# Patient Record
Sex: FEMALE | Race: WHITE | NOT HISPANIC OR LATINO | Employment: PART TIME | URBAN - NONMETROPOLITAN AREA
[De-identification: names, ages, dates, MRNs, and addresses within clinical notes are randomized per-mention and may not be internally consistent; named-entity substitution may affect disease eponyms.]

---

## 2023-05-09 LAB
THYROTROPIN (MIU/L) IN SER/PLAS BY DETECTION LIMIT <= 0.05 MIU/L: 4.11 MIU/L (ref 0.44–3.98)
THYROXINE (T4) FREE (NG/DL) IN SER/PLAS: 0.89 NG/DL (ref 0.61–1.12)

## 2023-11-14 ENCOUNTER — APPOINTMENT (OUTPATIENT)
Dept: CARDIOLOGY | Facility: HOSPITAL | Age: 73
End: 2023-11-14
Payer: MEDICARE

## 2023-11-21 ENCOUNTER — APPOINTMENT (OUTPATIENT)
Dept: CARDIOLOGY | Facility: HOSPITAL | Age: 73
End: 2023-11-21
Payer: MEDICARE

## 2023-11-28 ENCOUNTER — APPOINTMENT (OUTPATIENT)
Dept: CARDIOLOGY | Facility: HOSPITAL | Age: 73
End: 2023-11-28
Payer: MEDICARE

## 2023-11-30 ENCOUNTER — APPOINTMENT (OUTPATIENT)
Dept: CARDIOLOGY | Facility: HOSPITAL | Age: 73
End: 2023-11-30
Payer: MEDICARE

## 2023-12-07 ENCOUNTER — HOSPITAL ENCOUNTER (OUTPATIENT)
Dept: CARDIOLOGY | Facility: HOSPITAL | Age: 73
Discharge: HOME | End: 2023-12-07
Payer: MEDICARE

## 2023-12-07 DIAGNOSIS — I50.22 CHRONIC SYSTOLIC (CONGESTIVE) HEART FAILURE (MULTI): ICD-10-CM

## 2023-12-14 ENCOUNTER — HOSPITAL ENCOUNTER (OUTPATIENT)
Dept: CARDIOLOGY | Facility: HOSPITAL | Age: 73
Discharge: HOME | End: 2023-12-14
Payer: MEDICARE

## 2023-12-14 VITALS — DIASTOLIC BLOOD PRESSURE: 100 MMHG | SYSTOLIC BLOOD PRESSURE: 146 MMHG | HEART RATE: 94 BPM

## 2023-12-14 DIAGNOSIS — I48.91 UNSPECIFIED ATRIAL FIBRILLATION (MULTI): ICD-10-CM

## 2023-12-14 DIAGNOSIS — I50.22 CHRONIC SYSTOLIC (CONGESTIVE) HEART FAILURE (MULTI): ICD-10-CM

## 2023-12-14 PROCEDURE — 93350 STRESS TTE ONLY: CPT | Performed by: STUDENT IN AN ORGANIZED HEALTH CARE EDUCATION/TRAINING PROGRAM

## 2023-12-14 PROCEDURE — 93018 CV STRESS TEST I&R ONLY: CPT | Performed by: STUDENT IN AN ORGANIZED HEALTH CARE EDUCATION/TRAINING PROGRAM

## 2023-12-14 PROCEDURE — 93350 STRESS TTE ONLY: CPT

## 2023-12-14 PROCEDURE — 93321 DOPPLER ECHO F-UP/LMTD STD: CPT

## 2023-12-14 PROCEDURE — 93016 CV STRESS TEST SUPVJ ONLY: CPT | Performed by: STUDENT IN AN ORGANIZED HEALTH CARE EDUCATION/TRAINING PROGRAM

## 2023-12-14 PROCEDURE — 93321 DOPPLER ECHO F-UP/LMTD STD: CPT | Performed by: STUDENT IN AN ORGANIZED HEALTH CARE EDUCATION/TRAINING PROGRAM

## 2023-12-14 PROCEDURE — 93017 CV STRESS TEST TRACING ONLY: CPT

## 2024-03-26 ENCOUNTER — HOSPITAL ENCOUNTER (OUTPATIENT)
Dept: RADIOLOGY | Facility: EXTERNAL LOCATION | Age: 74
Discharge: HOME | End: 2024-03-26

## 2024-03-26 ENCOUNTER — HOSPITAL ENCOUNTER (OUTPATIENT)
Dept: RADIOLOGY | Facility: HOSPITAL | Age: 74
Discharge: HOME | End: 2024-03-26
Payer: COMMERCIAL

## 2024-03-26 VITALS — WEIGHT: 208 LBS | BODY MASS INDEX: 28.17 KG/M2 | HEIGHT: 72 IN

## 2024-03-26 DIAGNOSIS — R92.8 OTHER ABNORMAL AND INCONCLUSIVE FINDINGS ON DIAGNOSTIC IMAGING OF BREAST: ICD-10-CM

## 2024-03-26 PROCEDURE — 77062 BREAST TOMOSYNTHESIS BI: CPT

## 2024-03-26 PROCEDURE — 77066 DX MAMMO INCL CAD BI: CPT | Performed by: RADIOLOGY

## 2024-03-26 PROCEDURE — G0279 TOMOSYNTHESIS, MAMMO: HCPCS | Performed by: RADIOLOGY

## 2024-04-02 NOTE — PROGRESS NOTES
Carolin Talamantes female   1950 74 y.o.   [id number]      Chief Complaint  Biopsy consultation    History Of Present Illness  Carolin Talamantes is a 74 y.o.  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 She has no family history of breast cancer. She denies breast surgery or biopsy. She had right breast pain on 2024. She states a few days later she palpated a right breast mass and shortly after noticed right breast erythema. She denies trauma to the breast. She is currently on Eliquis for A-fib and also reports she is the sole provider for her  who is on an LVAD.     BREAST IMAGING: 3/22/2024 [facility] Right breast ultrasound, indicates BI-RADS Category 5. Right breast mass warranting additional views. 3/26/2024 [facility] Bilateral diagnostic mammogram, indicates BI-RADS Category 5. Right breast, 12:00, 6 cm from the nipple, 4.0 x 2.0 x 2.8 cm irregular spiculated mass with mild associated architectural distortion and associated pleomorphic calcifications. Ultrasound guided biopsy recommended.     REPRODUCTIVE HISTORY: menarche age 15, , first birth age 26,  x 33 months, OCP's x 1 year, natural menopause age 57, no HRT, scattered fibroglandular tissue    FAMILY CANCER HISTORY:   None     Surgical History  She has no past surgical history on file.     Social History  She reports that she has never smoked. She has never used smokeless tobacco. No history on file for alcohol use and drug use.    Family History  No family history on file.     Allergies  Erythromycin    Medications  Current Outpatient Medications   Medication Instructions    apixaban (ELIQUIS) 5 mg, oral, 2 times daily    metoprolol succinate XL (TOPROL-XL) 25 mg, oral, Daily RT         REVIEW OF SYSTEMS    Constitutional:  Negative for appetite change, fatigue, fever and unexpected weight change.   HENT:  Negative for ear pain, hearing loss,  nosebleeds, sore throat and trouble swallowing.    Eyes:  Negative for discharge, itching and visual disturbance.   Respiratory:  Negative for cough, chest tightness and shortness of breath.    Cardiovascular:  Negative for chest pain, palpitations and leg swelling.   Breast: as indicated in HPI  Gastrointestinal:  Negative for abdominal pain, constipation, diarrhea and nausea.   Endocrine: Negative for cold intolerance and heat intolerance.   Genitourinary:  Negative for dysuria, frequency, hematuria, pelvic pain and vaginal bleeding.   Musculoskeletal:  Negative for arthralgias, back pain, gait problem, joint swelling and myalgias.   Skin:  Negative for color change and rash.   Allergic/Immunologic: Negative for environmental allergies and food allergies.   Neurological:  Negative for dizziness, tremors, speech difficulty, weakness, numbness and headaches.   Hematological:  Does not bruise/bleed easily.   Psychiatric/Behavioral:  Negative for agitation, dysphoric mood and sleep disturbance. The patient is not nervous/anxious.         Past Medical History  She has no past medical history on file.     Physical Exam  Patient is alert and oriented x3 and in a relaxed and appropriate mood. Her gait is steady and hand grasps are equal. Sclera is clear. The exam was limited due to request for sitting position. The breasts are nearly symmetrical. The left breast tissue is soft without palpable abnormalities, discrete nodules or masses. The left breast, subareolar region is tender to touch. The right breast, 11:00, 5 cm from the nipple, 5.5 x 5.5 cm hard non-mobile mass. The surrounding skin with mild erythema and peau d' orange. The right breast, subareolar region is tender to touch. The left breast skin and nipple appear normal. There is no cervical, supraclavicular or left axillary lymphadenopathy. I am able to palpate a 2.0 cm right axillary lymph node. Heart rate and rhythm normal, S1 and S2 appreciated. The lungs are  clear to auscultation bilaterally. Abdomen is soft and non-tender.       Physical Exam  Chest:              Last Recorded Vitals  Vitals:    04/09/24 1205   BP: 114/83   Pulse: 108   Resp: 18       Relevant Results   Time was spent discussing digital images of the radiology testing with the patient. I explained the results in depth, along with suggested explanation for follow up recommendations based on the testing results. BI-RADS Category 5    Imaging  Narrative   Interpreted By:  Gareth Zarate,  STUDY:  BI MAMMO BILATERAL DIAGNOSTIC TOMOSYNTHESIS; 3/26/2024 1:40 pm      ACCESSION NUMBER(S):  [id number]      ORDERING CLINICIAN:  SALLIE CRANDALL      INDICATION:  Diagnostic mammogram, Signs/Symptoms:inconclusive findings on dx  imaging of breast 3/22/2024.      COMPARISON:  Ultrasound of the right breast dated 03/22/2024      FINDINGS:  CC and MLO 2D digital mammograms and digital breast tomosynthesis  images were obtained of the bilateral breasts. 3-D volume images were  reconstructed in 4 views at an independent workstation as 1 mm slices  through the breasts in both the CC and MLO projections.      Density:  There are areas of scattered fibroglandular tissue.      There is an irregular spiculated mass with mild associated  architectural distortion and associated pleomorphic calcifications  identified in the upper outer quadrant of the right breast,  corresponding with the mass seen on ultrasound. No additional mass or  focal asymmetry is identified.      This study was interpreted with CAD.       Impression   Mass in the right breast, highly suggestive in appearance for  malignancy. Further evaluation with ultrasound-guided biopsy is  recommended.      BI-RADS CATEGORY:  BI-RADS CATEGORY:  5 Highly Suggestive of Malignancy.  Recommendation:  Biopsy.  Recommended Date:  Immediate.  Laterality:  Bilateral.           BI US breast limited right  Order: [id number]  Impression    BIRADS 5 - Highly suggestive of  malignancy, skin thickening may suggest an inflammatory breast component.  Recommend bilateral mammography and/or MRI, possibly PET staging if positive cytology is obtained given these current aggressive features.      TRANSCRIBED BY:        ELECTRONICALLY SIGNED BY: Manuel Soriano MD  Narrative      FINDINGS:  Targeted sonographic evaluation of the right breast was performed.  Irregular-shaped, non-parallel, microlobulated, spiculated, angular, hypoechogenic mass 12 o'clock location 6 cm the nipple, epicenter 2 cm from the skin surface, 4.0 x 2.0 x 2.8 cm.  Posterior shadowing, possible neighboring satellite lesions. Skin thickening.  No benign features.  Exam End: 03/22/24 15:21    Specimen Collected: 03/22/24 15:42 Last Resulted: 03/25/24 07:00   Received From: [facility]        Assessment/Plan   Abnormal mammogram, right breast mass, right axillary lymphadenopathy, no family history of breast cancer, scattered fibroglandular tissue    Plan: Right breast ultrasound guided biopsy. Highly recommended rescan of right axilla.     Patient Discussion/Summary  I recommend a right breast ultrasound guided biopsy. A breast radiology physician will perform the procedure. Possible diagnoses include benign, atypia or cancer. Bruising and mild discomfort after the biopsy is normal and will improve. I typically have results in 3-5 business days. I will call you with the results, please have your phone handy to take my call. If you receive medical information from [facility] Personal Health Record, it is possible to view or see results of your biopsy or procedure before I contact you directly. I will provide recommendations for future follow up based on your biopsy results.     You can see your health information, review clinical summaries from office visits & test results online when you follow your health with MY  Chart, a personal health record. To sign up go to [email or web address]. If you need assistance  with signing up or trouble getting into your account call [employer] Patient Line 24/7 at [phone].    Should you have any questions or concerns after biopsy, please do not hesitate to call my office at [phone]. If it has been more than a week since your biopsy was performed and you have not received results, please call my office at [phone]. Thank you for choosing [facility] and trusting me as your healthcare provider. I am honored to be a provider on your health care team and I remain dedicated to helping you achieve your health goals.      Elda Maier, APRN-CNP

## 2024-04-03 ENCOUNTER — APPOINTMENT (OUTPATIENT)
Dept: SURGICAL ONCOLOGY | Facility: HOSPITAL | Age: 74
End: 2024-04-03
Payer: COMMERCIAL

## 2024-04-09 ENCOUNTER — HOSPITAL ENCOUNTER (OUTPATIENT)
Dept: RADIOLOGY | Facility: HOSPITAL | Age: 74
Discharge: HOME | End: 2024-04-09
Payer: COMMERCIAL

## 2024-04-09 ENCOUNTER — PROCEDURE VISIT (OUTPATIENT)
Dept: SURGICAL ONCOLOGY | Facility: HOSPITAL | Age: 74
End: 2024-04-09
Payer: COMMERCIAL

## 2024-04-09 VITALS
BODY MASS INDEX: 28.04 KG/M2 | WEIGHT: 207 LBS | DIASTOLIC BLOOD PRESSURE: 83 MMHG | HEIGHT: 72 IN | RESPIRATION RATE: 18 BRPM | SYSTOLIC BLOOD PRESSURE: 114 MMHG | HEART RATE: 108 BPM

## 2024-04-09 DIAGNOSIS — R92.8 ABNORMAL MAMMOGRAM: ICD-10-CM

## 2024-04-09 DIAGNOSIS — N63.11 MASS OF UPPER OUTER QUADRANT OF RIGHT BREAST: ICD-10-CM

## 2024-04-09 DIAGNOSIS — R23.4 CHANGE OF SKIN OF BREAST: ICD-10-CM

## 2024-04-09 DIAGNOSIS — R59.0 AXILLARY LYMPHADENOPATHY: ICD-10-CM

## 2024-04-09 DIAGNOSIS — R92.8 ABNORMAL MAMMOGRAM: Primary | ICD-10-CM

## 2024-04-09 PROCEDURE — 19083 BX BREAST 1ST LESION US IMAG: CPT | Mod: RT

## 2024-04-09 PROCEDURE — 38505 NEEDLE BIOPSY LYMPH NODES: CPT | Mod: RIGHT SIDE | Performed by: STUDENT IN AN ORGANIZED HEALTH CARE EDUCATION/TRAINING PROGRAM

## 2024-04-09 PROCEDURE — 88360 TUMOR IMMUNOHISTOCHEM/MANUAL: CPT | Performed by: PATHOLOGY

## 2024-04-09 PROCEDURE — 99204 OFFICE O/P NEW MOD 45 MIN: CPT | Performed by: NURSE PRACTITIONER

## 2024-04-09 PROCEDURE — 88305 TISSUE EXAM BY PATHOLOGIST: CPT | Performed by: PATHOLOGY

## 2024-04-09 PROCEDURE — 2780000003 HC OR 278 NO HCPCS

## 2024-04-09 PROCEDURE — C1819 TISSUE LOCALIZATION-EXCISION: HCPCS

## 2024-04-09 PROCEDURE — 10035 PLMT SFT TISS LOCLZJ DEV 1ST: CPT | Mod: RIGHT SIDE | Performed by: STUDENT IN AN ORGANIZED HEALTH CARE EDUCATION/TRAINING PROGRAM

## 2024-04-09 PROCEDURE — 2500000005 HC RX 250 GENERAL PHARMACY W/O HCPCS: Performed by: STUDENT IN AN ORGANIZED HEALTH CARE EDUCATION/TRAINING PROGRAM

## 2024-04-09 PROCEDURE — 76642 ULTRASOUND BREAST LIMITED: CPT | Mod: RIGHT SIDE | Performed by: STUDENT IN AN ORGANIZED HEALTH CARE EDUCATION/TRAINING PROGRAM

## 2024-04-09 PROCEDURE — 77065 DX MAMMO INCL CAD UNI: CPT | Mod: RT

## 2024-04-09 PROCEDURE — 2720000007 HC OR 272 NO HCPCS

## 2024-04-09 PROCEDURE — 88305 TISSUE EXAM BY PATHOLOGIST: CPT | Mod: TC,STJLAB,91 | Performed by: NURSE PRACTITIONER

## 2024-04-09 PROCEDURE — 76982 USE 1ST TARGET LESION: CPT | Mod: RT

## 2024-04-09 PROCEDURE — A4648 IMPLANTABLE TISSUE MARKER: HCPCS

## 2024-04-09 PROCEDURE — 77065 DX MAMMO INCL CAD UNI: CPT | Mod: RIGHT SIDE | Performed by: STUDENT IN AN ORGANIZED HEALTH CARE EDUCATION/TRAINING PROGRAM

## 2024-04-09 PROCEDURE — 76642 ULTRASOUND BREAST LIMITED: CPT | Mod: RT

## 2024-04-09 PROCEDURE — 19286 PERQ DEV BREAST ADD US IMAG: CPT | Mod: RT

## 2024-04-09 PROCEDURE — 99214 OFFICE O/P EST MOD 30 MIN: CPT | Performed by: NURSE PRACTITIONER

## 2024-04-09 PROCEDURE — 19083 BX BREAST 1ST LESION US IMAG: CPT | Mod: RIGHT SIDE | Performed by: STUDENT IN AN ORGANIZED HEALTH CARE EDUCATION/TRAINING PROGRAM

## 2024-04-09 RX ORDER — METOPROLOL SUCCINATE 25 MG/1
25 TABLET, EXTENDED RELEASE ORAL
COMMUNITY
Start: 2023-07-17 | End: 2024-05-20 | Stop reason: SDUPTHER

## 2024-04-09 RX ADMIN — Medication 17 ML: at 14:30

## 2024-04-09 NOTE — PATIENT INSTRUCTIONS
I recommend a right breast ultrasound guided biopsy. A breast radiology physician will perform the procedure. Possible diagnoses include benign, atypia or cancer. Bruising and mild discomfort after the biopsy is normal and will improve. I typically have results in 3-5 business days. I will call you with the results, please have your phone handy to take my call. If you receive medical information from [facility] Personal Health Record, it is possible to view or see results of your biopsy or procedure before I contact you directly. I will provide recommendations for future follow up based on your biopsy results.     You can see your health information, review clinical summaries from office visits & test results online when you follow your health with MY  Chart, a personal health record. To sign up go to [email or web address]. If you need assistance with signing up or trouble getting into your account call [employer] Patient Line 24/7 at [phone].    Should you have any questions or concerns after biopsy, please do not hesitate to call my office at [phone]. If it has been more than a week since your biopsy was performed and you have not received results, please call my office at [phone]. Thank you for choosing [facility] and trusting me as your healthcare provider. I am honored to be a provider on your health care team and I remain dedicated to helping you achieve your health goals.

## 2024-04-15 ENCOUNTER — TELEPHONE (OUTPATIENT)
Dept: SURGERY | Facility: HOSPITAL | Age: 74
End: 2024-04-15
Payer: MEDICARE

## 2024-04-15 LAB
LAB AP ASR DISCLAIMER: NORMAL
LABORATORY COMMENT REPORT: NORMAL
PATH REPORT.ADDENDUM SPEC: NORMAL
PATH REPORT.FINAL DX SPEC: NORMAL
PATH REPORT.GROSS SPEC: NORMAL
PATH REPORT.RELEVANT HX SPEC: NORMAL
PATH REPORT.TOTAL CANCER: NORMAL
RESIDENT REVIEW: NORMAL

## 2024-04-15 NOTE — TELEPHONE ENCOUNTER
Spoke with Carolin regarding right breast and right axillary biopsy results. [place] [facility]

## 2024-04-22 NOTE — PROGRESS NOTES
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   Atrial fibrillation on Eliquis   -maintained anticoagulation for biopsies, but would need to stop anticoagulation for surgical intervention.  May require bridging with lovenox pending risk assessment.  Defer perioperative prophylaxis to prescribing physician.      Discussed the pathology and treatment options with the patient and family.    First, we discussed surgical options: breast conservation vs mastectomy.      Breast conservation involves removal of the tumor with a rim of normal breast tissue called a margin.  BCT is often done in conjunction with radiation to decrease risk of recurrence.  Mastectomy involves removal of all breast tissue.  When mastectomy is recommended, referral to a plastic surgeon for reconstruction is recommended.      Given the size of her cancer, she would be best served with mastectomy.    We then discussed the role of axillary staging. We discussed sentinel lymph node biopsy, indications for axillary node dissection and risks and benefits of each procedure. She has biopsy proven axillary sabino metastasis with several additional abnormal nodes.   In th presence of sabino involvement with HER2+ disease she would be recommended to have neoadjuvant systemic therapy with HER2 containing immune therapy to downstage the disease in the breast and lymph nodes to possibly allow less extensive axillary surgery if she were to have a complete sabino response which would decrease her risk for lymphedema.  We also discussed the extensive axillary involvement would not make her a candidate for primary surgery for various reasons including risk of injury to nearby neurovascular structures.    Radiation is usually recommended when BCT is performed.  Women 70 or greater with early  stage hormone receptor positive cancer may be able to omit radiation.  Radiation is recommended after mastectomy for tumors larger than 5cm, positive lymph nodes, or positive margins.  She has several indications for post mastectomy radiation given size >5cm and positive axillary nodes.    The need for chemotherapy will be based on surgical pathology and will be decided by the medical oncologist.  Genomic testing may be ordered on the tissue after surgical excision to help guide decision making for systemic therapy.  The medical oncologist is also responsible for prescribing endocrine therapy for hormone receptor positive tumors.       Carlos Talamantes is a 74 y.o. female presents today for evaluation of recently diagnosed carcinoma of the right breast.     The patient was initially referred for evaluation of breast pain first noted 2/14/2024.  Shortly after this she palpated a mass in the right breast with associated erythema.       Follow-up diagnostic imaging on 3/26/2024 at [facility] reveals a right breast mass at 12:00 6cmFN measuring 4cm.    Imaging repeated by  breast radiology 4/9/2024 with mass measuring 5.1cm with at least 3 abnormal nodes.    ultrasound guided biopsy was performed on 4/9/2024:    FINAL DIAGNOSIS   A. Breast, right, 11:00, 5 cm from nipple, ultrasound guided core needle biopsy:  -- Invasive ductal carcinoma, grade 3, with associated micro calcifications, see note.  -- Ductal carcinoma in situ, high nuclear grade, solid and cribriform pattern with necrosis and calcifications     Note: In this limited sample, the invasive carcinoma measures up to 15 mm in greatest dimension. ER, AK and HER2 will be reported in an addendum.     B. Lymph node, right, axilla, biopsy:  -- Metastatic carcinoma, see note.      Note: In this limited sample, the invasive carcinoma measures up to  10 mm in greatest dimension.     Estrogen Receptor (clone SP1):                     Positive                                                                           Percentage with nuclear stainin%                                                                          Intensity of staining: Moderate to strong      Progesterone Receptor (clone SP2):              Positive   Percentage with nuclear stainin%                                                                          Intensity of staining: Moderate to strong      HER2 (clone 4B5):                                          Positive (3+)      Of note, patient is on Eliquis for afib.    Menarche: 15  AFLB: 26  Menopause: 57  HRT: no  Previous biopsies: No  Previous breast surgeries: No  Prior breast cancer: No    Family history: no breast or ovarian cancer.  Thinks there may be uterine cancer history in the family.    Review of Systems   Constitutional symptoms: Denies generalized fatigue.  Denies weight change, fevers/chills, difficulty sleeping   Eyes: Denies double vision, glaucoma, cataracts.  Ear/nose/throat/mouth: Denies hearing changes, sore throat, sinus problems.  Cardiovascular: No chest pain.  Denies irregular heartbeat.  Denies ankle swelling.  Respiratory: No wheezing, cough, or shortness of breath.  Gastrointestinal: No abdominal pain,  No nausea/vomiting.  No indigestion/heartburn.  No change in bowel habits.  No constipation or diarrhea.   Genitourinary: No urinary incontinence.  No urinary frequency.  No painful urination.  Musculoskeletal: No bone pain, no muscle pain, no joint pain.   Integumentary: No rash. No masses.  No changes in moles.  No easy bruising.  Neurological: No headaches.  No tremors. No numbness/tingling.  Psychiatric: No anxiety. No depression.  Endocrine: No excessive thirst.  Not too hot or too cold.  Not tired or fatigued.    Hematological/lymphatic: No swollen glands or blood clotting problems.  No bruising.    Objective   Physical Exam  General: Alert and oriented x 3.  Mood and affect are appropriate.  HEENT:  EOMI, PERRLA.   Neck: supple, no masses, no cervical adenopathy.  Cardiovascular: no lower extremity edema.  Pulmonary: breathing non labored on room air.  GI: Abdomen soft, no masses. No hepatomegaly or splenomegaly.  Lymph nodes: No supraclavicular or axillary adenopathy bilaterally. There is conglomerate of lymph nodes palpable in the right axilla.  Musculoskeletal: Full range of motion in the upper extremities bilaterally.  Neuro: denies dizziness, tremors    Breasts: The breasts were examined in both the seated and supine positions.    RIGHT: The nipple is everted without nipple discharge.  There is a faint hue of erythema in the breast.  Dependent edema but not peau d'orange.  This is not clinically consistent with inflammatory breast cancer. There is a large 5cm mass in the superior breast at 12:00 not involving skin or chest wall.    LEFT: The nipple is everted without nipple discharge.  There are no skin changes, skin thickening, or dimpling. There are no masses palpated in the LEFT breast.     Radiology review: All images and reports were personally reviewed.         Morena Garrido DO

## 2024-04-23 ENCOUNTER — LAB (OUTPATIENT)
Dept: LAB | Facility: CLINIC | Age: 74
End: 2024-04-23
Payer: COMMERCIAL

## 2024-04-23 ENCOUNTER — APPOINTMENT (OUTPATIENT)
Dept: HEMATOLOGY/ONCOLOGY | Facility: CLINIC | Age: 74
End: 2024-04-23
Payer: COMMERCIAL

## 2024-04-23 ENCOUNTER — HOSPITAL ENCOUNTER (OUTPATIENT)
Dept: RADIOLOGY | Facility: CLINIC | Age: 74
Discharge: HOME | End: 2024-04-23
Payer: COMMERCIAL

## 2024-04-23 ENCOUNTER — HOSPITAL ENCOUNTER (OUTPATIENT)
Dept: RADIOLOGY | Facility: CLINIC | Age: 74
End: 2024-04-23
Payer: COMMERCIAL

## 2024-04-23 ENCOUNTER — OFFICE VISIT (OUTPATIENT)
Dept: HEMATOLOGY/ONCOLOGY | Facility: CLINIC | Age: 74
End: 2024-04-23
Payer: COMMERCIAL

## 2024-04-23 ENCOUNTER — OFFICE VISIT (OUTPATIENT)
Dept: SURGICAL ONCOLOGY | Facility: CLINIC | Age: 74
End: 2024-04-23
Payer: COMMERCIAL

## 2024-04-23 DIAGNOSIS — C50.111 MALIGNANT NEOPLASM OF CENTRAL PORTION OF RIGHT BREAST IN FEMALE, ESTROGEN RECEPTOR POSITIVE (MULTI): Primary | ICD-10-CM

## 2024-04-23 DIAGNOSIS — Z17.0 MALIGNANT NEOPLASM OF CENTRAL PORTION OF RIGHT BREAST IN FEMALE, ESTROGEN RECEPTOR POSITIVE (MULTI): ICD-10-CM

## 2024-04-23 DIAGNOSIS — C50.111 MALIGNANT NEOPLASM OF CENTRAL PORTION OF RIGHT BREAST IN FEMALE, ESTROGEN RECEPTOR POSITIVE (MULTI): ICD-10-CM

## 2024-04-23 DIAGNOSIS — Z51.81 ENCOUNTER FOR MONITORING CARDIOTOXIC DRUG THERAPY: ICD-10-CM

## 2024-04-23 DIAGNOSIS — Z17.0 MALIGNANT NEOPLASM OF CENTRAL PORTION OF RIGHT BREAST IN FEMALE, ESTROGEN RECEPTOR POSITIVE (MULTI): Primary | ICD-10-CM

## 2024-04-23 DIAGNOSIS — Z79.899 ENCOUNTER FOR MONITORING CARDIOTOXIC DRUG THERAPY: ICD-10-CM

## 2024-04-23 LAB
ALBUMIN SERPL BCP-MCNC: 4.3 G/DL (ref 3.4–5)
ALP SERPL-CCNC: 92 U/L (ref 33–136)
ALT SERPL W P-5'-P-CCNC: 21 U/L (ref 7–45)
ANION GAP SERPL CALC-SCNC: 10 MMOL/L (ref 10–20)
APTT PPP: 37 SECONDS (ref 27–38)
AST SERPL W P-5'-P-CCNC: 24 U/L (ref 9–39)
BASOPHILS # BLD AUTO: 0.03 X10*3/UL (ref 0–0.1)
BASOPHILS NFR BLD AUTO: 0.5 %
BILIRUB SERPL-MCNC: 0.7 MG/DL (ref 0–1.2)
BUN SERPL-MCNC: 23 MG/DL (ref 6–23)
CALCIUM SERPL-MCNC: 9.3 MG/DL (ref 8.6–10.3)
CHLORIDE SERPL-SCNC: 106 MMOL/L (ref 98–107)
CO2 SERPL-SCNC: 28 MMOL/L (ref 21–32)
CREAT SERPL-MCNC: 1.07 MG/DL (ref 0.5–1.05)
EGFRCR SERPLBLD CKD-EPI 2021: 55 ML/MIN/1.73M*2
EOSINOPHIL # BLD AUTO: 0.11 X10*3/UL (ref 0–0.4)
EOSINOPHIL NFR BLD AUTO: 1.8 %
ERYTHROCYTE [DISTWIDTH] IN BLOOD BY AUTOMATED COUNT: 13 % (ref 11.5–14.5)
GLUCOSE SERPL-MCNC: 101 MG/DL (ref 74–99)
HCT VFR BLD AUTO: 41.3 % (ref 36–46)
HGB BLD-MCNC: 13.5 G/DL (ref 12–16)
IMM GRANULOCYTES # BLD AUTO: 0.01 X10*3/UL (ref 0–0.5)
IMM GRANULOCYTES NFR BLD AUTO: 0.2 % (ref 0–0.9)
INR PPP: 1.1 (ref 0.9–1.1)
LYMPHOCYTES # BLD AUTO: 1.24 X10*3/UL (ref 0.8–3)
LYMPHOCYTES NFR BLD AUTO: 19.9 %
MCH RBC QN AUTO: 29.6 PG (ref 26–34)
MCHC RBC AUTO-ENTMCNC: 32.7 G/DL (ref 32–36)
MCV RBC AUTO: 91 FL (ref 80–100)
MONOCYTES # BLD AUTO: 0.41 X10*3/UL (ref 0.05–0.8)
MONOCYTES NFR BLD AUTO: 6.6 %
NEUTROPHILS # BLD AUTO: 4.42 X10*3/UL (ref 1.6–5.5)
NEUTROPHILS NFR BLD AUTO: 71 %
NRBC BLD-RTO: NORMAL /100{WBCS}
PLATELET # BLD AUTO: 194 X10*3/UL (ref 150–450)
POTASSIUM SERPL-SCNC: 4.3 MMOL/L (ref 3.5–5.3)
PROT SERPL-MCNC: 7.1 G/DL (ref 6.4–8.2)
PROTHROMBIN TIME: 12.6 SECONDS (ref 9.8–12.8)
RBC # BLD AUTO: 4.56 X10*6/UL (ref 4–5.2)
SODIUM SERPL-SCNC: 140 MMOL/L (ref 136–145)
WBC # BLD AUTO: 6.2 X10*3/UL (ref 4.4–11.3)

## 2024-04-23 PROCEDURE — 71260 CT THORAX DX C+: CPT | Performed by: RADIOLOGY

## 2024-04-23 PROCEDURE — 85610 PROTHROMBIN TIME: CPT

## 2024-04-23 PROCEDURE — 74177 CT ABD & PELVIS W/CONTRAST: CPT | Performed by: RADIOLOGY

## 2024-04-23 PROCEDURE — 74177 CT ABD & PELVIS W/CONTRAST: CPT

## 2024-04-23 PROCEDURE — 85025 COMPLETE CBC W/AUTO DIFF WBC: CPT

## 2024-04-23 PROCEDURE — 99205 OFFICE O/P NEW HI 60 MIN: CPT | Performed by: SURGERY

## 2024-04-23 PROCEDURE — 2550000001 HC RX 255 CONTRASTS: Performed by: SURGERY

## 2024-04-23 PROCEDURE — 85730 THROMBOPLASTIN TIME PARTIAL: CPT

## 2024-04-23 PROCEDURE — 36415 COLL VENOUS BLD VENIPUNCTURE: CPT

## 2024-04-23 PROCEDURE — 80053 COMPREHEN METABOLIC PANEL: CPT

## 2024-04-23 PROCEDURE — 99205 OFFICE O/P NEW HI 60 MIN: CPT | Performed by: STUDENT IN AN ORGANIZED HEALTH CARE EDUCATION/TRAINING PROGRAM

## 2024-04-23 PROCEDURE — 99215 OFFICE O/P EST HI 40 MIN: CPT | Mod: 25 | Performed by: STUDENT IN AN ORGANIZED HEALTH CARE EDUCATION/TRAINING PROGRAM

## 2024-04-23 PROCEDURE — 99215 OFFICE O/P EST HI 40 MIN: CPT | Mod: 25 | Performed by: SURGERY

## 2024-04-23 RX ORDER — PROCHLORPERAZINE MALEATE 5 MG
10 TABLET ORAL EVERY 6 HOURS PRN
Status: CANCELLED | OUTPATIENT
Start: 2024-05-07

## 2024-04-23 RX ORDER — ALBUTEROL SULFATE 0.83 MG/ML
3 SOLUTION RESPIRATORY (INHALATION) AS NEEDED
Status: CANCELLED | OUTPATIENT
Start: 2024-05-07

## 2024-04-23 RX ORDER — PROCHLORPERAZINE EDISYLATE 5 MG/ML
10 INJECTION INTRAMUSCULAR; INTRAVENOUS EVERY 6 HOURS PRN
Status: CANCELLED | OUTPATIENT
Start: 2024-05-07

## 2024-04-23 RX ORDER — DIPHENHYDRAMINE HYDROCHLORIDE 50 MG/ML
50 INJECTION INTRAMUSCULAR; INTRAVENOUS AS NEEDED
Status: CANCELLED | OUTPATIENT
Start: 2024-05-07

## 2024-04-23 RX ORDER — FAMOTIDINE 10 MG/ML
20 INJECTION INTRAVENOUS ONCE AS NEEDED
Status: CANCELLED | OUTPATIENT
Start: 2024-05-07

## 2024-04-23 RX ORDER — EPINEPHRINE 0.3 MG/.3ML
0.3 INJECTION SUBCUTANEOUS EVERY 5 MIN PRN
Status: CANCELLED | OUTPATIENT
Start: 2024-05-07

## 2024-04-23 RX ADMIN — IOHEXOL 75 ML: 350 INJECTION, SOLUTION INTRAVENOUS at 15:11

## 2024-04-23 NOTE — PROGRESS NOTES
Patient ID: Carolin Talamantes is a 74 y.o. female.    The patient presents to clinic today for her history of breast cancer.    Cancer Staging   Malignant neoplasm of central portion of right breast in female, estrogen receptor positive (Multi)  Staging form: Breast, AJCC 8th Edition  - Clinical: Stage IIB (cT3, cN1(f), cM0, G3, ER+, WV+, HER2+) - Signed by Morena Garrido DO on 4/23/2024        Diagnostic/Therapeutic History:    - 02/2024: breast pain followed by feeling a breast mass in her right breast    - 03/26/2024: follow up imaging on 03/26/2024 showed mass measuring 4cm at 12 oclock, 6 CFN    - 04/09/2024: US right breast showed a 5.1 cm mass at 11 oclock 5 CFN with at least 3 morphologically abnormal LN    - 04/09/2024: Right breast mass US guided core needle biopsy showed IDC G3 ER: 95%, WV: 20%, HER2 3+  with right axilla positive for metastatic carcinoma    History of Present Illness (HPI)/Interval History:  Accompanied by her  today    She denies any fevers or chills.    She denies any chest pain or breathing issues.     She denies any vision changes or headache issues, dizziness, loss of balance, neuropathy and no falls.     She denies any new or unexplained bone aches or pains.    She denies any skin lesions or masses, hair loss, nail changes, or oral sores.    She reports a normal appetite and normal bowel movements. Her weight is stable.     She denies any issues with sleep or fatigue.     PMH: Afib on eliquis and metoprolol    PSH: No surgery    Allergies: no allergies, GI intolerence to erythromycin    Social hx: no smoking, no alcohol    Family hx: no hx of cancer    Menarche at 15, menopause: 57, OCP x1 years,  No HRT      Review of Systems:  14-point ROS otherwise negative, as per HPI.    No past medical history on file.    No past surgical history on file.    Social History     Socioeconomic History    Marital status:      Spouse name: Not on file    Number of children: Not on  "file    Years of education: Not on file    Highest education level: Not on file   Occupational History    Not on file   Tobacco Use    Smoking status: Never    Smokeless tobacco: Never   Substance and Sexual Activity    Alcohol use: Not on file    Drug use: Not on file    Sexual activity: Not on file   Other Topics Concern    Not on file   Social History Narrative    Not on file     Social Determinants of Health     Financial Resource Strain: Not on file   Food Insecurity: Not on file   Transportation Needs: Not on file   Physical Activity: Not on file   Stress: Not on file   Social Connections: Not on file   Intimate Partner Violence: Not on file   Housing Stability: Not on file       Allergies   Allergen Reactions    Erythromycin GI Upset     Other reaction(s): GI Upset         Current Outpatient Medications:     apixaban (Eliquis) 5 mg tablet, Take 1 tablet (5 mg) by mouth twice a day., Disp: , Rfl:     metoprolol succinate XL (Toprol-XL) 25 mg 24 hr tablet, Take 1 tablet (25 mg) by mouth once daily., Disp: , Rfl:      Objective    BSA: There is no height or weight on file to calculate BSA.  There were no vitals taken for this visit.    ECOG Performance Status: 1    Physical Exam    GA: alert, oriented, cooperative  HEENT: anicteric sclera, well injected conjunctiva  Heart: RRR, no murmurs or gallops heard  Lung: CTAB  Abd: +BS, soft, non tender abdomen  Ext: peripheral pulses positive  Right breast: 5 cm mass not extending to chest wall, mild erythema    Laboratory Data:  No results found for: \"WBC\", \"HGB\", \"HCT\", \"MCV\", \"PLT\", \"ANC\"    Chemistry    No results found for: \"NA\", \"K\", \"CL\", \"CO2\", \"BUN\", \"CREATININE\", \"GLU\" No results found for: \"CALCIUM\", \"ALKPHOS\", \"AST\", \"ALT\", \"BILITOT\"          Radiology:  BI US guided breast localization and biopsy right, BI breast biopsy clip imaging  Addendum: Interpreted By:  Bernardo Damon,    ADDENDUM:   The final pathology for right breast mass at 11 o'clock, 5 cm from   " the nipple demonstrates invasive ductal carcinoma, grade 3, with   associated microcalcifications and ductal carcinoma in-situ, high   nuclear grade, solid and cribriform pattern with necrosis and   calcifications. This is concordant with the imaging findings. The   final pathology for the right axillary lymph node demonstrates   metastatic carcinoma. This is concordant with the imaging findings.   Follow-up with patient's surgical provider is recommended.        Estimated size of mass/extent of disease:  The mass measures 5.1 x   2.5 x 4.2 cm on US dated 04/09/2024. On spot magnification views,   calcifications measure up to 7.6 cm in AP extent and extend   anteriorly to approximately 2.3 cm from the nipple.        Ipsilateral lymph nodes: Biopsied lymph node is positive for   metastatic carcinoma. At least 3 morphologically abnormal lymph nodes   were visualized.        MRI recommendation: At the discretion of the referring surgeon.        Method of Detection: Category Pat - Patient Reported Self-examination   Finding        Signed by: Bernardo Damon 4/12/2024 4:35 PM        -------- ORIGINAL REPORT --------   Dictation workstation:   [id number]  Narrative: Interpreted By:  Bernardo Damon,   STUDY:  BI US GUIDED BREAST LOCALIZATION AND BIOPSY RIGHT; BI BREAST BIOPSY  CLIP IMAGING;  4/9/2024 3:06 pm; 4/9/2024 3:38 pm      ACCESSION NUMBER(S):  [id number]; [id number]      ORDERING CLINICIAN:  DAVID MCCRAY      INDICATION:  Abnormal right breast mass and axillary lymph node.      FINDINGS:  PREPROCEDURAL CONSULTATION: The history and physical exam pertinent  to the procedure were reviewed and no updates were made.      The procedure was explained to the patient including the risks,  benefits, and alternatives. Medications were discussed, including any  prior use of blood thinning medications by the patient. Patient  allergies were reviewed. The risks, including but not limited to  infection and bleeding, were  reviewed by the performing physician and  the patient agreed to undergo the procedure.      Prior to the procedure, an audible timeout was done to verify patient  identification, site and type of procedure. Dr. Bernardo Damon and an  ultrasound technologist were present.      PROCEDURE 1: Scanning of the right breast redemonstrated the mass of  concern in the 11 o'clock position, 5 cm from the nipple. The right  breast was marked under ultrasound guidance and cleansed. 10 mL of  buffered 1% lidocaine was injected subcutaneously and then into the  deeper tissues surrounding the mass. A small incision was made. 2  tissue core specimens were then obtained with a 12-gauge  spring-loaded biopsy needle with minimal bleeding (estimated blood  loss less than 10 mL).  A open coil Hydromark tissue marker was then  placed into the biopsy site.  Hemostasis was achieved with manual  compression. The patient tolerated the procedure without difficulty.      PROCEDURE 2: Scanning of the right axilla redemonstrated the lymph  node of concern. The right axilla was marked under ultrasound  guidance and cleansed.  7 mL of buffered 1% lidocaine was injected  subcutaneously and then into the deeper tissues surrounding the node.  A small incision was made. 3 tissue core specimens were then obtained  with a 14-gauge spring-loaded biopsy needle with minimal bleeding  (estimated blood loss less than 10 mL).  A butterfly Hydromark tissue  marker was then placed into the biopsy site.  Hemostasis was achieved  with manual compression. The patient tolerated the second procedure  without difficulty.      The postbiopsy mammogram demonstrates satisfactory placement of the  biopsy tissue marker in the breast. Biopsy marker in the right axilla  was not visualized on post clip mammography. Additional spot  magnification views were also obtained of the suspicious right breast  calcifications associated with her mass. Together, the mass and  calcifications  measure up to 7.6 cm in AP extent and are within  approximately 2.3 cm of the nipple. Additional biopsy can be  performed for extent of disease if clinically indicated.      The patient experienced no complications during the procedure.  Home-going/follow-up instructions were reviewed with the patient  before she left the department.      Impression: Status post ultrasound guided core needle biopsy of a right breast  mass and axillary lymph node followed by tissue marker placement.  Pathology is pending.      Additional spot magnification views were also obtained of the  suspicious right breast calcifications associated with her mass.  Together, the mass and calcifications measure up to 7.6 cm in AP  extent and are within approximately 2.3 cm of the nipple. Additional  biopsy can be performed for extent of disease if clinically indicated.      POST PROCEDURE MAMMOGRAM FOR MARKER PLACEMENT.          Signed by: Bernardo Damon 4/9/2024 5:17 PM  Dictation workstation:   [id number]       BI US breast limited right 04/09/2024    Narrative  Interpreted By:  Bernardo Damon,  STUDY:  BI US BREAST LIMITED RIGHT;  4/9/2024 2:03 pm    ACCESSION NUMBER(S):  [id number]    ORDERING CLINICIAN:  DAVID MCCRAY    INDICATION:  Repeat sonographic evaluation of the right breast and axilla was  performed prior to biopsy. Initially, patient declined repeat  mammogram, however she agreed that additional mammographic views  would be useful during her biopsy procedure in these were done during  her post clip imaging.    COMPARISON:  03/26/2024, 03/22/2024    FINDINGS:  Targeted ultrasound was performed of the right breast by a registered  sonographer with elastography. In the right breast at 11 o'clock, 5  cm from the nipple, in the patient's area of palpable concern, there  is an irregular hypoechoic mass containing internal calcifications  measuring at least 5.1 x 2.5 x 4.2 cm. This is highly concerning for  malignancy. Scanning of the  right axilla demonstrates at least 3  morphologically abnormal lymph nodes with cortical thickening.    Impression  Highly suspicious right breast mass with right axillary  lymphadenopathy. Biopsy will be subsequently performed. Additional  mammographic images were obtained at the time of post clip imaging.    BI-RADS CATEGORY:    BI-RADS CATEGORY:  5 Highly Suggestive of Malignancy.  Recommendation:  Surgical Consultation and Biopsy.  Recommended Date:  Immediate.  Laterality:  Right.    For any future breast imaging appointments, please call [phone] ([phone]).      MACRO:  Bernardo Damon discussed the significance and urgency of this critical  finding in person with  DAVID MCCRAY on 4/9/2024 at 1 pm.  (**-RCF-**) Findings:  See findings.    Signed by: Bernardo Damon 4/9/2024 5:14 PM  Dictation workstation:   [id number]          Assessment/Plan:    74 year old female patient with hx of Afib on metoprolol and eliquis with newly diagnosed HER2 positive BC presenting to discuss treatment options    - 02/2024: breast pain followed by feeling a breast mass in her right breast    - 03/26/2024: follow up imaging on 03/26/2024 showed mass measuring 4cm at 12 oclock, 6 CFN    - 04/09/2024: US right breast showed a 5.1 cm mass at 11 oclock 5 CFN with at least 3 morphologically abnormal LN    - 04/09/2024: Right breast mass US guided core needle biopsy showed IDC G3 ER: 95%, DE: 20%, HER2 3+  with right axilla positive for metastatic carcinoma     I reviewed with her the events that led to her diagnosis of breast cancer. We reviewed all the procedures and diagnostic imaging she underwent thus far. I discussed the features of her breast cancer that include the size, grade, lymph node status and  hormone receptor/ her2-bobby status. Per NCCN guidelines, for HER2 positive breast ca more than 2cm and/or +LN, the current recommendation would be neoadjuvant treatment with dual HER2  blockade as well as chemotherapy. The goal of  treatment would be to  downstage her cancer as well as to assess tumor chemosensitivity (whether or not a pathologic complete response is achieved or not). Carolin does not want to pursue chemotherapy and we agreed on doing dual HER2 blockade only with pertuzumab and trastuzumab, with plan for 6 cycles followed by surgery.      Plan to start in 2 weeks  onco echo, Onc cards referral  Patient agreable with plan of care   Staging scans with CT C/A/P and bone scan     At least 60 minutes of direct consultation was spent reviewing the patient's chart as well as discussing and  reviewing the  cancer care plan including educating and answering questions and concerns, greater than 50 percent spent in counseling and coordination of care.          Cassy Whaley MD  Hematology and Medical Oncology  [facility]

## 2024-04-24 ENCOUNTER — TELEPHONE (OUTPATIENT)
Dept: ADMISSION | Facility: HOSPITAL | Age: 74
End: 2024-04-24
Payer: MEDICARE

## 2024-04-24 ENCOUNTER — TELEPHONE (OUTPATIENT)
Dept: SURGICAL ONCOLOGY | Facility: HOSPITAL | Age: 74
End: 2024-04-24
Payer: MEDICARE

## 2024-04-24 DIAGNOSIS — C50.111 MALIGNANT NEOPLASM OF CENTRAL PORTION OF RIGHT BREAST IN FEMALE, ESTROGEN RECEPTOR POSITIVE (MULTI): ICD-10-CM

## 2024-04-24 DIAGNOSIS — Z17.0 MALIGNANT NEOPLASM OF CENTRAL PORTION OF RIGHT BREAST IN FEMALE, ESTROGEN RECEPTOR POSITIVE (MULTI): ICD-10-CM

## 2024-04-24 NOTE — TELEPHONE ENCOUNTER
Pt called- she saw her FUV/tx is scheduled in [place] on 5/15. 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 Also, her  has a standing appt on Tuesdays and Thursdays from 1230-130 so those days are hard also. She said she would be willing to see Dr. Whaley on Tuesdays in [place] if needed, but ideally she wants to have treatment on Monday at the [facility] location. She would like to speak to Yareli to discuss these scheduling concerns. Message sent to the team.

## 2024-04-24 NOTE — TELEPHONE ENCOUNTER
Pt called inquiring about her CT results. She is aware that you are operating today and will wait on your call.

## 2024-04-25 ENCOUNTER — HOSPITAL ENCOUNTER (OUTPATIENT)
Dept: RADIOLOGY | Facility: HOSPITAL | Age: 74
Discharge: HOME | End: 2024-04-25
Payer: COMMERCIAL

## 2024-04-25 ENCOUNTER — TELEPHONE (OUTPATIENT)
Dept: ADMISSION | Facility: HOSPITAL | Age: 74
End: 2024-04-25
Payer: MEDICARE

## 2024-04-25 DIAGNOSIS — C50.111 MALIGNANT NEOPLASM OF CENTRAL PORTION OF RIGHT BREAST IN FEMALE, ESTROGEN RECEPTOR POSITIVE (MULTI): ICD-10-CM

## 2024-04-25 DIAGNOSIS — Z17.0 MALIGNANT NEOPLASM OF CENTRAL PORTION OF RIGHT BREAST IN FEMALE, ESTROGEN RECEPTOR POSITIVE (MULTI): ICD-10-CM

## 2024-04-25 PROCEDURE — 78306 BONE IMAGING WHOLE BODY: CPT | Performed by: STUDENT IN AN ORGANIZED HEALTH CARE EDUCATION/TRAINING PROGRAM

## 2024-04-25 PROCEDURE — 3430000001 HC RX 343 DIAGNOSTIC RADIOPHARMACEUTICALS: Performed by: SURGERY

## 2024-04-25 PROCEDURE — 78306 BONE IMAGING WHOLE BODY: CPT

## 2024-04-25 PROCEDURE — A9503 TC99M MEDRONATE: HCPCS | Performed by: SURGERY

## 2024-04-25 RX ADMIN — TECHNETIUM TC 99M MEDRONATE 25.2 MILLICURIE: 25 INJECTION, POWDER, FOR SOLUTION INTRAVENOUS at 10:00

## 2024-04-25 NOTE — TELEPHONE ENCOUNTER
Carolin is asking if she will be starting an aromatase inhibitor after she completes her infusions.   If so, she is asking for a ballpark timeframe of when this would be.   Also asking if the AI stops progesterone in addition to the estrogen?

## 2024-04-26 ENCOUNTER — TELEPHONE (OUTPATIENT)
Dept: HEMATOLOGY/ONCOLOGY | Facility: HOSPITAL | Age: 74
End: 2024-04-26
Payer: MEDICARE

## 2024-04-26 NOTE — TELEPHONE ENCOUNTER
Carolin is calling back from this mornings dr hidalgo.  She is hoping to speak to him.  See note from this am.      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  She can take calls anytime after 11 am if you call back on Monday.      Message sent

## 2024-04-28 PROBLEM — Z78.9 NEVER SMOKED ANY SUBSTANCE: Status: ACTIVE | Noted: 2024-04-28

## 2024-04-28 PROBLEM — E78.5 HYPERLIPIDEMIA: Status: ACTIVE | Noted: 2024-04-28

## 2024-04-28 PROBLEM — I48.0 PAROXYSMAL ATRIAL FIBRILLATION (MULTI): Status: ACTIVE | Noted: 2024-04-28

## 2024-04-28 PROBLEM — I51.89 MILD LEFT VENTRICULAR SYSTOLIC DYSFUNCTION (LVSD): Status: ACTIVE | Noted: 2024-04-28

## 2024-04-28 PROBLEM — R94.31 ABNORMAL ELECTROCARDIOGRAM DURING EXERCISE STRESS TEST: Status: ACTIVE | Noted: 2024-04-28

## 2024-04-28 PROBLEM — I50.32 CHRONIC DIASTOLIC HEART FAILURE (MULTI): Status: ACTIVE | Noted: 2024-04-28

## 2024-04-28 PROBLEM — Z79.01 CHRONIC ANTICOAGULATION: Status: ACTIVE | Noted: 2024-04-28

## 2024-04-29 ENCOUNTER — OFFICE VISIT (OUTPATIENT)
Dept: CARDIOLOGY | Facility: CLINIC | Age: 74
End: 2024-04-29
Payer: COMMERCIAL

## 2024-04-29 VITALS
OXYGEN SATURATION: 97 % | DIASTOLIC BLOOD PRESSURE: 65 MMHG | RESPIRATION RATE: 16 BRPM | BODY MASS INDEX: 30.46 KG/M2 | SYSTOLIC BLOOD PRESSURE: 120 MMHG | HEART RATE: 96 BPM | TEMPERATURE: 97 F | WEIGHT: 212.74 LBS | HEIGHT: 70 IN

## 2024-04-29 DIAGNOSIS — Z78.9 NEVER SMOKED ANY SUBSTANCE: ICD-10-CM

## 2024-04-29 DIAGNOSIS — I51.89 MILD LEFT VENTRICULAR SYSTOLIC DYSFUNCTION (LVSD): ICD-10-CM

## 2024-04-29 DIAGNOSIS — E78.2 MIXED HYPERLIPIDEMIA: ICD-10-CM

## 2024-04-29 DIAGNOSIS — Z79.01 CHRONIC ANTICOAGULATION: ICD-10-CM

## 2024-04-29 DIAGNOSIS — R94.31 ABNORMAL ELECTROCARDIOGRAM DURING EXERCISE STRESS TEST: ICD-10-CM

## 2024-04-29 DIAGNOSIS — Z17.0 MALIGNANT NEOPLASM OF CENTRAL PORTION OF RIGHT BREAST IN FEMALE, ESTROGEN RECEPTOR POSITIVE (MULTI): ICD-10-CM

## 2024-04-29 DIAGNOSIS — Z79.899 ENCOUNTER FOR MONITORING CARDIOTOXIC DRUG THERAPY: Primary | ICD-10-CM

## 2024-04-29 DIAGNOSIS — I50.32 CHRONIC DIASTOLIC HEART FAILURE (MULTI): ICD-10-CM

## 2024-04-29 DIAGNOSIS — C50.111 MALIGNANT NEOPLASM OF CENTRAL PORTION OF RIGHT BREAST IN FEMALE, ESTROGEN RECEPTOR POSITIVE (MULTI): ICD-10-CM

## 2024-04-29 DIAGNOSIS — Z51.81 ENCOUNTER FOR MONITORING CARDIOTOXIC DRUG THERAPY: Primary | ICD-10-CM

## 2024-04-29 DIAGNOSIS — I48.11 LONGSTANDING PERSISTENT ATRIAL FIBRILLATION (MULTI): ICD-10-CM

## 2024-04-29 DIAGNOSIS — I48.0 PAROXYSMAL ATRIAL FIBRILLATION (MULTI): ICD-10-CM

## 2024-04-29 PROCEDURE — 93010 ELECTROCARDIOGRAM REPORT: CPT | Performed by: INTERNAL MEDICINE

## 2024-04-29 PROCEDURE — 93005 ELECTROCARDIOGRAM TRACING: CPT | Performed by: INTERNAL MEDICINE

## 2024-04-29 PROCEDURE — 1160F RVW MEDS BY RX/DR IN RCRD: CPT | Performed by: INTERNAL MEDICINE

## 2024-04-29 PROCEDURE — 1126F AMNT PAIN NOTED NONE PRSNT: CPT | Performed by: INTERNAL MEDICINE

## 2024-04-29 PROCEDURE — 99205 OFFICE O/P NEW HI 60 MIN: CPT | Performed by: INTERNAL MEDICINE

## 2024-04-29 PROCEDURE — 1159F MED LIST DOCD IN RCRD: CPT | Performed by: INTERNAL MEDICINE

## 2024-04-29 PROCEDURE — 99215 OFFICE O/P EST HI 40 MIN: CPT | Performed by: INTERNAL MEDICINE

## 2024-04-29 RX ORDER — LOSARTAN POTASSIUM 25 MG/1
25 TABLET ORAL DAILY
Qty: 30 TABLET | Refills: 11 | Status: SHIPPED | OUTPATIENT
Start: 2024-04-29 | End: 2024-04-30 | Stop reason: SDUPTHER

## 2024-04-29 ASSESSMENT — PATIENT HEALTH QUESTIONNAIRE - PHQ9
2. FEELING DOWN, DEPRESSED OR HOPELESS: NOT AT ALL
SUM OF ALL RESPONSES TO PHQ9 QUESTIONS 1 AND 2: 0
1. LITTLE INTEREST OR PLEASURE IN DOING THINGS: NOT AT ALL

## 2024-04-29 ASSESSMENT — ENCOUNTER SYMPTOMS
SLEEP DISTURBANCE: 1
DEPRESSION: 0
FATIGUE: 1
OCCASIONAL FEELINGS OF UNSTEADINESS: 0
LOSS OF SENSATION IN FEET: 0
ARTHRALGIAS: 1
RESPIRATORY NEGATIVE: 1
PALPITATIONS: 1
NEUROLOGICAL NEGATIVE: 1

## 2024-04-29 ASSESSMENT — COLUMBIA-SUICIDE SEVERITY RATING SCALE - C-SSRS
2. HAVE YOU ACTUALLY HAD ANY THOUGHTS OF KILLING YOURSELF?: NO
1. IN THE PAST MONTH, HAVE YOU WISHED YOU WERE DEAD OR WISHED YOU COULD GO TO SLEEP AND NOT WAKE UP?: NO
6. HAVE YOU EVER DONE ANYTHING, STARTED TO DO ANYTHING, OR PREPARED TO DO ANYTHING TO END YOUR LIFE?: NO

## 2024-04-29 ASSESSMENT — PAIN SCALES - GENERAL: PAINLEVEL: 0-NO PAIN

## 2024-04-29 NOTE — TELEPHONE ENCOUNTER
Per Dr. Whaley, he attempted to call patient back and left her a voice mail message. Dr. Whaley would like to be notified if she calls back.

## 2024-04-29 NOTE — PROGRESS NOTES
CARDIOLOGY NEW PATIENT OFFICE VISIT    Patient:  Carolin Talamantes  YOB: 1950  MRN: [id number]       Chief Complaint/Active Symptoms:       Carolin Talamantes is a 74 y.o. female who is being seen today at the request of Dr. Whaley for evaluation of Cardiotoxic drug therapy.    No chief complaint on file.      History of Present Illness:   HPI    Here prior to initiate therapy for breast cancer. Patient normally follows with a [facility] cardiologist at [place]. Saw her husbands cardiologist once for evaluation last year. Has a history of paroxysmal atrial fibrillation on oral anticoagulation that has been persistent for the past 18 months. On Metoprolol and eliquis without complications at this time.     Diagnosed with mild LV dysfunction by echo 7/2023. Had stress echo in December for part of work-up with a fib but without inducible ischemic / RWMA. No clinical cardiac event other than her atrial fibrillation. Prefers rate control and anticoagulation for treatment of her a fib. Has no chest pain or discomfort, no shortness of breath. No edema, claudication, history of stroke or TIA. Is active and teaching. Exercises regularly and does an elliptical for 30 minutes 2-3 days per week without difficulty and without recent change. Cardiologist note states she has chronic diastolic heart failure - patient unaware.     Patient voiced concerns about receiving potentially cardiotoxic drug therapy when she has mild LV dysfunction. Also concerned about multi-drug regimens. Referred patient back to her Oncologist to discuss regimens. Discussed Cut-offs recommended for treatment and that treatment decisions will be made by the patient and her Oncologist. We will support decisions and guide/make recommendations to help her receive the recommended treatment.     Cancer Diagnosis: Right breast cancer, ER+,FL+, HER2+  Oncologist: Jovana  Treatment: Pertuzumab/trastuzumab q 3 weeks to start 5/6/24    Risk Factors: Mild LV  dysfunction, longstanding persistent atrial fibrillation, Borderline HTN, HLD, chronic diastolic heart failure  SH: nonsmoker  FH: No premature CAD    Testing:  Stress echo 12/2023 - nondiagnostic EKG due to resting abn, mild LV dysfunction without inducible wall motion abn.   Echo 7/2023 EF 45-50%  CT cardiac score pending   CT chest/abd/pelvis - no remark on coronary calcifications.     Allergies:     Allergies   Allergen Reactions    Erythromycin GI Upset     Other reaction(s): GI Upset        Outpatient Medications:     Current Outpatient Medications   Medication Instructions    apixaban (ELIQUIS) 5 mg, oral, 2 times daily    metoprolol succinate XL (TOPROL-XL) 25 mg, oral, Daily RT        Past Medical History:     No past medical history on file.    Social History:     Social History     Socioeconomic History    Marital status:      Spouse name: Not on file    Number of children: Not on file    Years of education: Not on file    Highest education level: Not on file   Occupational History    Not on file   Tobacco Use    Smoking status: Never    Smokeless tobacco: Never   Substance and Sexual Activity    Alcohol use: Not on file    Drug use: Not on file    Sexual activity: Not on file   Other Topics Concern    Not on file   Social History Narrative    Not on file     Social Determinants of Health     Financial Resource Strain: Not on file   Food Insecurity: Not on file   Transportation Needs: Not on file   Physical Activity: Not on file   Stress: Not on file   Social Connections: Not on file   Intimate Partner Violence: Not on file   Housing Stability: Not on file       Family History:      No family history on file.    Review of Systems:     Review of Systems   Constitutional:  Positive for fatigue.   HENT:  Positive for congestion.    Respiratory: Negative.     Cardiovascular:  Positive for palpitations. Negative for chest pain and leg swelling.   Genitourinary: Negative.    Musculoskeletal:  Positive  for arthralgias.   Neurological: Negative.    Psychiatric/Behavioral:  Positive for sleep disturbance.    All other systems reviewed and are negative.      Objective:     Vitals:    04/29/24 1537   BP: 120/65   Pulse: 96   Resp: 16   Temp: 36.1 °C (97 °F)   SpO2: 97%       Physical Examination:   GENERAL:  Well developed, well nourished, in no acute distress.  HEENT: NC AT EOMI with anicteric sclera  NECK:  Supple, no JVD, no bruit.  CHEST:  Symmetric and nontender.  LUNGS:  Normal respiratory effort. Bilateral breath sounds clear to auscultation.   HEART:  PMI nondisplaced. Rate and rhythm regular with no evident murmur, no gallop appreciated. There are no rubs, clicks or heaves.  PERIPHERAL VASCULAR:  Pulses present and equally palpable; 2+ throughout.  ABDOMEN: Soft, NT, ND without HSM or palpable organomegaly, no bruits, normoactive bowel sounds  MUSCULOSKELETAL: No significant joint or limb deformity  EXTREMITIES:  Warm with good color, no clubbing or cyanosis.  There is no edema noted.  NEURO/PSYCH:  Alert and oriented times three with approppriate behavior and responses.  LYMPH: no significant palpable lymphadenopathy  SKIN: No rashes on exposed skin, no reported skin lesions.     Lab:     CBC:   Lab Results   Component Value Date    WBC 6.2 04/23/2024    RBC 4.56 04/23/2024    HGB 13.5 04/23/2024    HCT 41.3 04/23/2024     04/23/2024      Lab Results   Component Value Date    WBC 6.2 04/23/2024    RBC 4.56 04/23/2024    HGB 13.5 04/23/2024    HCT 41.3 04/23/2024    MCV 91 04/23/2024    MCH 29.6 04/23/2024    MCHC 32.7 04/23/2024    RDW 13.0 04/23/2024     04/23/2024       CMP:    Lab Results   Component Value Date     04/23/2024    K 4.3 04/23/2024     04/23/2024    CO2 28 04/23/2024    BUN 23 04/23/2024    CREATININE 1.07 (H) 04/23/2024    GLUCOSE 101 (H) 04/23/2024    CALCIUM 9.3 04/23/2024     Lab Results   Component Value Date     04/23/2024    K 4.3 04/23/2024      "04/23/2024    CO2 28 04/23/2024    BUN 23 04/23/2024    CREATININE 1.07 (H) 04/23/2024     Lab Results   Component Value Date    ALKPHOS 92 04/23/2024    ALT 21 04/23/2024    AST 24 04/23/2024    PROT 7.1 04/23/2024    BILITOT 0.7 04/23/2024       Magnesium:    No results found for: \"MG\"    Lipid Profile:    No results found for: \"CHLPL\", \"TRIG\", \"HDL\", \"LDLCALC\", \"LDLDIRECT\"    TSH:    Lab Results   Component Value Date    TSH 4.11 (H) 05/09/2023       BNP:   No results found for: \"BNP\"     PT/INR:    Lab Results   Component Value Date    PROTIME 12.6 04/23/2024    INR 1.1 04/23/2024       HgBA1c:    No results found for: \"HGBA1C\"    Cardiac Enzymes:    No results found for: \"TROPHS\"    Diagnostic Studies:   EKG today. A fib at 95 bpm, low voltage QRS, diffuse NS ST and T abn.   Echo 7/2023 EF 45-50%.     Echocardiogram Stress Test    Result Date: 1/4/2024             [place] [address] Phone [phone], Fax [phone] Exercise Stress Echo Patient Name:      NEMO KRISHNAMURTHY       Ordering Provider:     [id number] NIXON BALDERAS Study Date:        12/14/2023           Reading Physician:     Meryl Johnson MD MRN/PID:           [id number]             Supervising Physician: Meryl Johnson MD Accession#:        [id number]         Fellow: Date of Birth/Age: 1950 / 73 years Fellow: Gender:            F                    Nurse: Admit Date:        12/14/2023           Technician:            n/a Admission Status:  Outpatient           Sonographer:           KATELYNN Valle RVT Height:            182.88 cm            Technologist:          Kip Barorn RRT,                                             "                    CCT Weight:            90.72 kg             Additional Staff: BSA:               2.13 m2              Encounter#:            [id number] BMI:               27.13 kg/m2          Patient Location:      [facility] Stress Lab Study Type:    ECHOCARDIOGRAM STRESS TEST Diagnosis/ICD: Unspecified atrial fibrillation-I48.91 Indication:    Atrial Fibrillation CPT Codes:     Stress Echo-21124; Stress Test Interpretation-26840; Stress Test                Supervision-[id number] Falls Risk: Low: Patient has low risk for sustaining a fall; environmental safety interventions in place.  Study Details: Correct procedure and correct patient verified verbally and with                ID Band checked.  Patient History: Atrial fibrillation. Allergies: Erythromycin. Smoker:    Never. Diabetes:  No. BMI:       Overweight 25 - 30.  Medications: Metoprolol and apixaban. The patient did not take medications as prescribed.  Patient Performance: The patient exercised to stage II on a Kyler protocol for 6 minutes and 00 seconds, achieving 7.00 METS. The peak heart rate achieved was 181 bpm, which was 122 % of the age predicted target heart rate of 148 bpm. The resting blood pressure was 163/63 mmHg with a heart rate of 94 bpm. The patient developed dyspnea during the stress exam. The symptoms resolved with rest 4 minutes into recovery. The blood pressure response was normal. The test was terminated due to: dyspnea.  Baseline ECG: Resting ECG showed Atrial Fibrillation.  Stress ECG: Stress ECG showed atrial fibrillation.  Stress Stage Data: +----------------+---+------+-------+                 HR Sys BPDias BP +----------------+---+------+-------+ Baseline [id number]   63      +----------------+---+------+-------+ Stage I         [id number]   63      +----------------+---+------+-------+ Stage II        [id number]   70      +----------------+---+------+-------+  Recovery ECG: Recovery ECG showed atrial fibrillation. The heart  rate recovery was normal.  +-----------+---+------+-------+            HR Sys BPDias BP +-----------+---+------+-------+ Recovery I 127              +-----------+---+------+-------+ Recovery [id number]   70      +-----------+---+------+-------+ Recovery [id number]   80      +-----------+---+------+-------+  Summary:  1. Baseline EKG shows atrial fibrillation; ST-T segment changes suggestive of inferolateral ischemia.  2. Patient exercised for 6 minutes achieving 7.0 METS.  3. Exercise capacity is average for age.  4. Heart rate response to exercise is normal. Blood pressure response to exercise is normal.  5. With exercise ST-T segment changes with 1 mm depressions in leads II, 3, aVF is seen; 1 mm depressions also seen in V3-V6. Depressions resolved 2 minutes into recovery.  6. Baseline echocardiogram shows low normal ejection fraction 45-50%. With exercise no obvious regional wall motion abnormalities are noted.  7. There are electrocardiographic changes consistent with ischemia; no obvious imaging evidence of ischemia however inferolateral walls was not completely visualized.  8. Cortez treadmill score is 1 (moderate risk).  9. Adequate level of stress achieved. [id number] Bob Johnson MD Electronically signed on 12/14/2023 at 12:46:47 PM   ** Final (Updated) **      No nuclear medicine results found for the past 12 months    No valid procedures specified.    Radiology:     No valid procedures specified.    Assessment:     Problem List Items Addressed This Visit       Malignant neoplasm of central portion of right breast in female, estrogen receptor positive (Multi)    Relevant Orders    ECG 12 lead (Ancillary Performed)    Paroxysmal atrial fibrillation (Multi)    Relevant Orders    ECG 12 lead (Ancillary Performed)    Chronic anticoagulation    Chronic diastolic heart failure (Multi)    Relevant Orders    ECG 12 Lead (Completed)    Hyperlipidemia    Mild left ventricular systolic dysfunction (LVSD)  - Primary    Relevant Medications    losartan (Cozaar) 25 mg tablet    Abnormal electrocardiogram during exercise stress test    Never smoked any substance    Encounter for monitoring cardiotoxic drug therapy    Relevant Orders    Onco-Echo Limited (Strain And 3D)    Troponin I, High Sensitivity    Follow Up In Cardiology     Other Visit Diagnoses       Longstanding persistent atrial fibrillation (Multi)        Relevant Orders    Holter Or Event Cardiac Monitor    ECG 12 Lead (Completed)            Plan:   1.  Right breast cancer with plans for cardiotoxic drug treatment.  Patient concerns were understood but we discussed how are recommended cut off her potential cardiotoxic drug therapy as it 40%.  Despite her mild LV dysfunction there is no signs or symptoms of heart failure that affect this patient's physical activity or quality of life at this time.  The etiology of her LV dysfunction at this time is not unknown.  There are no old studies but there were also no change in her EF between July and December of last year.  Because of her LV dysfunction I have recommended early follow-up.  I recommend obtaining a baseline troponin and BN peptide and I would obtain 1 after her first and second cycles of treatment and completed an echocardiogram at least by the time she is completed her second cycle of her treatment.  Right now the patient is traveling a lot between [place] and [place] where she works and she would prefer if it is possible to have her testing and follow-up in [place] at [facility].  Will try to make that work for the patient until she moved up here on a more permanent basis.  Reviewed the signs and symptoms of heart failure and chest discomfort and when to contact us and seek earlier medical attention.    2.  Mild LV dysfunction.  History of chronic diastolic heart failure.  The only EF we have in this patient is an EF of 45 to 50% from last summer.  According to the patient this was testing  ordered by her [facility] cardiologist because of her history of atrial fibrillation.  She believes that her A-fib rates have always been well-controlled, she has never had uncontrolled hypertension that could lead to LV dysfunction, she has no documented coronary artery disease although she has not followed through to get the CT cardiac score as yet.  She has not had a sudden illness that could be attributed such as a viral illness to a viral cardiomyopathy.  While she has mild LV dysfunction she does not have any symptoms of heart failure.  This places her in his stage B class II in nature.  I have recommended the patient go ahead and get the CT cardiac score as already suggested by her outpatient cardiologist.  As discussed above we will follow her closely and obtain baseline labs as well.  3.  Longstanding persistent atrial fibrillation on chronic anticoagulation.  Patient used to have paroxysmal atrial fibrillation but for the past 18 months has been in atrial fibrillation constantly.  She has not been interested in treatment with antiarrhythmic therapy or procedures to reverse her atrial fibrillation.  Will leave any decisions related to her atrial fibrillation at this time to her regular cardiologist.  Because of her mild LV dysfunction I have recommended that she have a 24-hour Holter monitor to make sure that her average heart rate is less than 100 bpm.  4.  Abnormal resting EKG and abnormal EKG during exercise stress test.  Patient has a baseline nonspecific diffuse ST and T wave abnormality.  It is not unexpected for that EKG abnormality to worsen during exercise.  The EKG is nondiagnostic during an exercise stress test because of the resting abnormality.  Therefore we are left with the echo imaging only that scribes no inducible wall motion abnormalities.  She may benefit if she has an elevated CT cardiac score from pharmacologic nuclear stress testing.  5.  Mixed hyperlipidemia.  If the patient CT  cardiac score is greater than 100 statin therapy should be initiated.  6.  Tobacco and weight status.  Patient is a non-smoker BMI is 30 we have encouraged heart healthy Mediterranean diet.    Will see the patient in follow-up after her early echocardiogram during treatment.  Will work with the staff and oncology and the patient to try to get her follow-up arranged at the [facility] location.  We reviewed the signs and symptoms she should watch for when to contact myself or the cardio oncology nursing staff.  We have discussed with the patient that we are not here to supplant her relationship with her cardiologist at the [facility] but to provide care and guidance while she receives treatment for her breast cancer.  She was reassured that as we can see his notes he should also be able to see all of our communications and testing as well.  She has their contact information/card.  Thank you for allowing her to participate in this patient's care.

## 2024-04-30 DIAGNOSIS — I51.89 MILD LEFT VENTRICULAR SYSTOLIC DYSFUNCTION (LVSD): ICD-10-CM

## 2024-04-30 DIAGNOSIS — C50.111 MALIGNANT NEOPLASM OF CENTRAL PORTION OF RIGHT BREAST IN FEMALE, ESTROGEN RECEPTOR POSITIVE (MULTI): Primary | ICD-10-CM

## 2024-04-30 DIAGNOSIS — Z17.0 MALIGNANT NEOPLASM OF CENTRAL PORTION OF RIGHT BREAST IN FEMALE, ESTROGEN RECEPTOR POSITIVE (MULTI): Primary | ICD-10-CM

## 2024-04-30 LAB
ATRIAL RATE: 78 BPM
Q ONSET: 221 MS
QRS COUNT: 15 BEATS
QRS DURATION: 90 MS
QT INTERVAL: 370 MS
QTC CALCULATION(BAZETT): 464 MS
QTC FREDERICIA: 431 MS
R AXIS: 35 DEGREES
T AXIS: -4 DEGREES
T OFFSET: 406 MS
VENTRICULAR RATE: 95 BPM

## 2024-04-30 RX ORDER — DIPHENHYDRAMINE HYDROCHLORIDE 50 MG/ML
50 INJECTION INTRAMUSCULAR; INTRAVENOUS AS NEEDED
Status: CANCELLED | OUTPATIENT
Start: 2024-05-06

## 2024-04-30 RX ORDER — LOSARTAN POTASSIUM 25 MG/1
25 TABLET ORAL DAILY
Qty: 90 TABLET | Refills: 3 | Status: SHIPPED | OUTPATIENT
Start: 2024-04-30 | End: 2024-05-20 | Stop reason: SDUPTHER

## 2024-04-30 RX ORDER — ALBUTEROL SULFATE 0.83 MG/ML
3 SOLUTION RESPIRATORY (INHALATION) AS NEEDED
Status: CANCELLED | OUTPATIENT
Start: 2024-05-06

## 2024-04-30 RX ORDER — LOSARTAN POTASSIUM 25 MG/1
25 TABLET ORAL DAILY
Qty: 30 TABLET | Refills: 11 | Status: SHIPPED | OUTPATIENT
Start: 2024-04-30 | End: 2024-04-30 | Stop reason: SDUPTHER

## 2024-04-30 RX ORDER — EPINEPHRINE 0.3 MG/.3ML
0.3 INJECTION SUBCUTANEOUS EVERY 5 MIN PRN
Status: CANCELLED | OUTPATIENT
Start: 2024-05-06

## 2024-04-30 RX ORDER — PROCHLORPERAZINE EDISYLATE 5 MG/ML
10 INJECTION INTRAMUSCULAR; INTRAVENOUS EVERY 6 HOURS PRN
Status: CANCELLED | OUTPATIENT
Start: 2024-05-06

## 2024-04-30 RX ORDER — PROCHLORPERAZINE MALEATE 10 MG
10 TABLET ORAL EVERY 6 HOURS PRN
Status: CANCELLED | OUTPATIENT
Start: 2024-05-06

## 2024-04-30 RX ORDER — FAMOTIDINE 10 MG/ML
20 INJECTION INTRAVENOUS ONCE AS NEEDED
Status: CANCELLED | OUTPATIENT
Start: 2024-05-06

## 2024-05-02 ENCOUNTER — HOSPITAL ENCOUNTER (OUTPATIENT)
Dept: CARDIOLOGY | Facility: HOSPITAL | Age: 74
Discharge: HOME | End: 2024-05-02
Payer: MEDICARE

## 2024-05-02 DIAGNOSIS — I48.11 LONGSTANDING PERSISTENT ATRIAL FIBRILLATION (MULTI): ICD-10-CM

## 2024-05-02 PROCEDURE — 9420000001 HC RT PATIENT EDUCATION 5 MIN

## 2024-05-02 PROCEDURE — 93225 XTRNL ECG REC<48 HRS REC: CPT

## 2024-05-03 ENCOUNTER — TELEMEDICINE (OUTPATIENT)
Dept: HEMATOLOGY/ONCOLOGY | Facility: HOSPITAL | Age: 74
End: 2024-05-03
Payer: MEDICARE

## 2024-05-03 ENCOUNTER — TELEPHONE (OUTPATIENT)
Dept: ADMISSION | Facility: HOSPITAL | Age: 74
End: 2024-05-03

## 2024-05-03 DIAGNOSIS — Z17.0 MALIGNANT NEOPLASM OF CENTRAL PORTION OF RIGHT BREAST IN FEMALE, ESTROGEN RECEPTOR POSITIVE (MULTI): Primary | ICD-10-CM

## 2024-05-03 DIAGNOSIS — C50.111 MALIGNANT NEOPLASM OF CENTRAL PORTION OF RIGHT BREAST IN FEMALE, ESTROGEN RECEPTOR POSITIVE (MULTI): Primary | ICD-10-CM

## 2024-05-03 DIAGNOSIS — Z17.0 MALIGNANT NEOPLASM OF CENTRAL PORTION OF RIGHT BREAST IN FEMALE, ESTROGEN RECEPTOR POSITIVE (MULTI): ICD-10-CM

## 2024-05-03 DIAGNOSIS — C50.111 MALIGNANT NEOPLASM OF CENTRAL PORTION OF RIGHT BREAST IN FEMALE, ESTROGEN RECEPTOR POSITIVE (MULTI): ICD-10-CM

## 2024-05-03 DIAGNOSIS — C50.912 LOBULAR CARCINOMA OF LEFT BREAST (MULTI): ICD-10-CM

## 2024-05-03 PROCEDURE — 1159F MED LIST DOCD IN RCRD: CPT | Performed by: STUDENT IN AN ORGANIZED HEALTH CARE EDUCATION/TRAINING PROGRAM

## 2024-05-03 PROCEDURE — 99214 OFFICE O/P EST MOD 30 MIN: CPT | Performed by: STUDENT IN AN ORGANIZED HEALTH CARE EDUCATION/TRAINING PROGRAM

## 2024-05-03 PROCEDURE — 1160F RVW MEDS BY RX/DR IN RCRD: CPT | Performed by: STUDENT IN AN ORGANIZED HEALTH CARE EDUCATION/TRAINING PROGRAM

## 2024-05-03 RX ORDER — FAMOTIDINE 10 MG/ML
20 INJECTION INTRAVENOUS ONCE AS NEEDED
Status: CANCELLED | OUTPATIENT
Start: 2024-05-28

## 2024-05-03 RX ORDER — DIPHENHYDRAMINE HYDROCHLORIDE 50 MG/ML
50 INJECTION INTRAMUSCULAR; INTRAVENOUS AS NEEDED
Status: CANCELLED | OUTPATIENT
Start: 2024-05-28

## 2024-05-03 RX ORDER — ALBUTEROL SULFATE 0.83 MG/ML
3 SOLUTION RESPIRATORY (INHALATION) AS NEEDED
Status: CANCELLED | OUTPATIENT
Start: 2024-05-28

## 2024-05-03 RX ORDER — HEPARIN 100 UNIT/ML
500 SYRINGE INTRAVENOUS AS NEEDED
Status: CANCELLED | OUTPATIENT
Start: 2024-05-03

## 2024-05-03 RX ORDER — EPINEPHRINE 0.3 MG/.3ML
0.3 INJECTION SUBCUTANEOUS EVERY 5 MIN PRN
Status: CANCELLED | OUTPATIENT
Start: 2024-05-28

## 2024-05-03 RX ORDER — PROCHLORPERAZINE MALEATE 10 MG
10 TABLET ORAL EVERY 6 HOURS PRN
Status: CANCELLED | OUTPATIENT
Start: 2024-05-28

## 2024-05-03 RX ORDER — ANASTROZOLE 1 MG/1
1 TABLET ORAL DAILY
Qty: 90 TABLET | Refills: 3 | Status: SHIPPED | OUTPATIENT
Start: 2024-05-03 | End: 2025-05-03

## 2024-05-03 RX ORDER — PROCHLORPERAZINE EDISYLATE 5 MG/ML
10 INJECTION INTRAMUSCULAR; INTRAVENOUS EVERY 6 HOURS PRN
Status: CANCELLED | OUTPATIENT
Start: 2024-05-28

## 2024-05-03 RX ORDER — HEPARIN SODIUM,PORCINE/PF 10 UNIT/ML
50 SYRINGE (ML) INTRAVENOUS AS NEEDED
Status: CANCELLED | OUTPATIENT
Start: 2024-05-03

## 2024-05-03 RX ORDER — ANASTROZOLE 1 MG/1
1 TABLET ORAL DAILY
Qty: 90 TABLET | Refills: 3 | Status: SHIPPED | OUTPATIENT
Start: 2024-05-03 | End: 2024-05-03 | Stop reason: SDUPTHER

## 2024-05-03 NOTE — PROGRESS NOTES
Patient ID: Carolin Talamantes is a 74 y.o. female.    The patient presents to clinic today for her history of breast cancer.     Cancer Staging   Malignant neoplasm of central portion of right breast in female, estrogen receptor positive (Multi)  Staging form: Breast, AJCC 8th Edition  - Clinical: Stage IIB (cT3, cN1(f), cM0, G3, ER+, DE+, HER2+) - Signed by Morena Garrido DO on 4/23/2024        Diagnostic/Therapeutic History:    - 02/2024: breast pain followed by feeling a breast mass in her right breast    - 03/26/2024: follow up imaging on 03/26/2024 showed mass measuring 4cm at 12 oclock, 6 CFN    - 04/09/2024: US right breast showed a 5.1 cm mass at 11 oclock 5 CFN with at least 3 morphologically abnormal LN    - 04/09/2024: Right breast mass US guided core needle biopsy showed IDC G3 ER: 95%, DE: 20%, HER2 3+  with right axilla positive for metastatic carcinoma    - 04/23/24: 6 mm right lower lobe nodule fup in 3-6M. Bone scan was negative    History of Present Illness (HPI)/Interval History:  Virtual visit:  No changes compared to last visit    She denies any fevers or chills.    She denies any chest pain or breathing issues.     She denies any vision changes or headache issues, dizziness, loss of balance, neuropathy and no falls.     She denies any new or unexplained bone aches or pains.    She denies any skin lesions or masses, hair loss, nail changes, or oral sores.    She reports a normal appetite and  normal bowel movements. Her weight is stable.     She denies any issues with sleep or fatigue.     PMH: Afib on eliquis and metoprolol    PSH: No surgery    Allergies: no allergies, GI intolerence to erythromycin    Social hx: no smoking, no alcohol    Family hx: no hx of cancer    Menarche at 15, menopause: 57, OCP x1 years,  No HRT      Review of Systems:  14-point ROS otherwise negative, as per HPI.    No past medical history on file.    No past surgical history on file.    Social History      Socioeconomic History    Marital status:      Spouse name: Not on file    Number of children: Not on file    Years of education: Not on file    Highest education level: Not on file   Occupational History    Not on file   Tobacco Use    Smoking status: Never    Smokeless tobacco: Never   Substance and Sexual Activity    Alcohol use: Not on file    Drug use: Not on file    Sexual activity: Not on file   Other Topics Concern    Not on file   Social History Narrative    Not on file     Social Determinants of Health     Financial Resource Strain: Not on file   Food Insecurity: Not on file   Transportation Needs: Not on file   Physical Activity: Not on file   Stress: Not on file   Social Connections: Not on file   Intimate Partner Violence: Not on file   Housing Stability: Not on file       Allergies   Allergen Reactions    Erythromycin GI Upset     Other reaction(s): GI Upset         Current Outpatient Medications:     apixaban (Eliquis) 5 mg tablet, Take 1 tablet (5 mg) by mouth twice a day., Disp: , Rfl:     losartan (Cozaar) 25 mg tablet, Take 1 tablet (25 mg) by mouth once daily., Disp: 90 tablet, Rfl: 3    metoprolol succinate XL (Toprol-XL) 25 mg 24 hr tablet, Take 1 tablet (25 mg) by mouth once daily., Disp: , Rfl:      Objective    BSA: There is no height or weight on file to calculate BSA.  There were no vitals taken for this visit.    ECOG Performance Status: 1    Physical Exam    GA: alert, oriented, cooperative  HEENT: anicteric sclera, well injected conjunctiva  Heart: RRR, no murmurs or gallops heard  Lung: CTAB  Abd: +BS, soft, non tender abdomen  Ext: peripheral pulses positive  Right breast: 5 cm mass not extending to chest wall, mild erythema    Laboratory Data:  Lab Results   Component Value Date    WBC 6.2 04/23/2024    HGB 13.5 04/23/2024    HCT 41.3 04/23/2024    MCV 91 04/23/2024     04/23/2024       Chemistry    Lab Results   Component Value Date/Time     04/23/2024 1342     K 4.3 04/23/2024 1342     04/23/2024 1342    CO2 28 04/23/2024 1342    BUN 23 04/23/2024 1342    CREATININE 1.07 (H) 04/23/2024 1342    Lab Results   Component Value Date/Time    CALCIUM 9.3 04/23/2024 1342    ALKPHOS 92 04/23/2024 1342    AST 24 04/23/2024 1342    ALT 21 04/23/2024 1342    BILITOT 0.7 04/23/2024 1342             Radiology:  ECG 12 Lead  Atrial fibrillation  Low voltage QRS  Nonspecific ST and T wave abnormality  Abnormal ECG  When compared with ECG of 14-DEC-2023 10:45,  ST no longer depressed in Inferior leads  T wave inversion no longer evident in Inferior leads  Confirmed by Pepito Greco ([id number]) on 4/30/2024 10:32:51 PM       BI US breast limited right 04/09/2024    Narrative  Interpreted By:  Bernardo Damon,  STUDY:  BI US BREAST LIMITED RIGHT;  4/9/2024 2:03 pm    ACCESSION NUMBER(S):  [id number]    ORDERING CLINICIAN:  DAVID MCCRAY    INDICATION:  Repeat sonographic evaluation of the right breast and axilla was  performed prior to biopsy. Initially, patient declined repeat  mammogram, however she agreed that additional mammographic views  would be useful during her biopsy procedure in these were done during  her post clip imaging.    COMPARISON:  03/26/2024, 03/22/2024    FINDINGS:  Targeted ultrasound was performed of the right breast by a registered  sonographer with elastography. In the right breast at 11 o'clock, 5  cm from the nipple, in the patient's area of palpable concern, there  is an irregular hypoechoic mass containing internal calcifications  measuring at least 5.1 x 2.5 x 4.2 cm. This is highly concerning for  malignancy. Scanning of the right axilla demonstrates at least 3  morphologically abnormal lymph nodes with cortical thickening.    Impression  Highly suspicious right breast mass with right axillary  lymphadenopathy. Biopsy will be subsequently performed. Additional  mammographic images were obtained at the time of post clip imaging.    BI-RADS  CATEGORY:    BI-RADS CATEGORY:  5 Highly Suggestive of Malignancy.  Recommendation:  Surgical Consultation and Biopsy.  Recommended Date:  Immediate.  Laterality:  Right.    For any future breast imaging appointments, please call [phone]).      MACRO:  Bernardo Damon discussed the significance and urgency of this critical  finding in person with  DAVID MCCRAY on 4/9/2024 at 1 pm.  (**-RCF-**) Findings:  See findings.    Signed by: Bernardo Damon 4/9/2024 5:14 PM  Dictation workstation:   [id number]          Assessment/Plan:    74 year old female patient with hx of Afib on metoprolol and eliquis with newly diagnosed HER2 positive BC presenting to discuss treatment options    - 02/2024: breast pain followed by feeling a breast mass in her right breast    - 03/26/2024: follow up imaging on 03/26/2024 showed mass measuring 4cm at 12 oclock, 6 CFN    - 04/09/2024: US right breast showed a 5.1 cm mass at 11 oclock 5 CFN with at least 3 morphologically abnormal LN    - 04/09/2024: Right breast mass US guided core needle biopsy showed IDC G3 ER: 95%, DE: 20%, HER2 3+  with right axilla positive for metastatic carcinoma     I reviewed with her the events that led to her diagnosis of breast cancer. We reviewed all the procedures and diagnostic imaging she underwent thus far. I discussed the features of her breast cancer that include the size, grade, lymph node status and  hormone receptor/ her2-bobby status. Per NCCN guidelines, for HER2 positive breast ca more than 2cm and/or +LN, the current recommendation would be neoadjuvant treatment with dual HER2  blockade as well as chemotherapy. The goal of treatment would be to  downstage her cancer as well as to assess tumor chemosensitivity (whether or not a pathologic complete response is achieved or not). Carolin does not want to pursue chemotherapy and we agreed on doing dual HER2 blockade only with pertuzumab and trastuzumab, as well as anastrozole with plan for 6  cycles followed by surgery.     Mild LV dysfunction-> CT cardiac score   Long standing Afib- Does have Holter  Follow up with Dr Lara  Plan to start treatment on Monday 05/06  RTC in 3 weeks        At least 35 minutes of direct consultation was spent reviewing the patient's chart as well as discussing and  reviewing the  cancer care plan including educating and answering questions and concerns, greater than 50 percent spent in counseling and coordination of care.          Cassy Whaley MD  Hematology and Medical Oncology  [facility]

## 2024-05-03 NOTE — TELEPHONE ENCOUNTER
Patient is calling to request that the anastrozole prescription be sent to [facility] pharmacy on file.

## 2024-05-06 ENCOUNTER — TELEPHONE (OUTPATIENT)
Dept: HEMATOLOGY/ONCOLOGY | Facility: CLINIC | Age: 74
End: 2024-05-06
Payer: MEDICARE

## 2024-05-06 ENCOUNTER — INFUSION (OUTPATIENT)
Dept: HEMATOLOGY/ONCOLOGY | Facility: CLINIC | Age: 74
End: 2024-05-06
Payer: MEDICARE

## 2024-05-06 VITALS
WEIGHT: 210.76 LBS | BODY MASS INDEX: 30.17 KG/M2 | OXYGEN SATURATION: 96 % | RESPIRATION RATE: 16 BRPM | HEART RATE: 78 BPM | DIASTOLIC BLOOD PRESSURE: 76 MMHG | SYSTOLIC BLOOD PRESSURE: 120 MMHG | HEIGHT: 70 IN | TEMPERATURE: 98.2 F

## 2024-05-06 DIAGNOSIS — Z51.81 ENCOUNTER FOR MONITORING CARDIOTOXIC DRUG THERAPY: ICD-10-CM

## 2024-05-06 DIAGNOSIS — Z79.899 ENCOUNTER FOR MONITORING CARDIOTOXIC DRUG THERAPY: ICD-10-CM

## 2024-05-06 DIAGNOSIS — C50.111 MALIGNANT NEOPLASM OF CENTRAL PORTION OF RIGHT BREAST IN FEMALE, ESTROGEN RECEPTOR POSITIVE (MULTI): ICD-10-CM

## 2024-05-06 DIAGNOSIS — Z17.0 MALIGNANT NEOPLASM OF CENTRAL PORTION OF RIGHT BREAST IN FEMALE, ESTROGEN RECEPTOR POSITIVE (MULTI): ICD-10-CM

## 2024-05-06 LAB
CARDIAC TROPONIN I PNL SERPL HS: 3 NG/L (ref 0–13)
HBV CORE AB SER QL: NONREACTIVE
HBV SURFACE AB SER-ACNC: <3.1 MIU/ML
HBV SURFACE AG SERPL QL IA: NONREACTIVE

## 2024-05-06 PROCEDURE — 96401 CHEMO ANTI-NEOPL SQ/IM: CPT

## 2024-05-06 PROCEDURE — 2500000004 HC RX 250 GENERAL PHARMACY W/ HCPCS (ALT 636 FOR OP/ED): Mod: JZ | Performed by: STUDENT IN AN ORGANIZED HEALTH CARE EDUCATION/TRAINING PROGRAM

## 2024-05-06 PROCEDURE — 84484 ASSAY OF TROPONIN QUANT: CPT | Performed by: INTERNAL MEDICINE

## 2024-05-06 PROCEDURE — 87340 HEPATITIS B SURFACE AG IA: CPT | Performed by: STUDENT IN AN ORGANIZED HEALTH CARE EDUCATION/TRAINING PROGRAM

## 2024-05-06 PROCEDURE — 86706 HEP B SURFACE ANTIBODY: CPT | Performed by: STUDENT IN AN ORGANIZED HEALTH CARE EDUCATION/TRAINING PROGRAM

## 2024-05-06 PROCEDURE — 86704 HEP B CORE ANTIBODY TOTAL: CPT | Performed by: STUDENT IN AN ORGANIZED HEALTH CARE EDUCATION/TRAINING PROGRAM

## 2024-05-06 RX ORDER — VIT C/E/ZN/COPPR/LUTEIN/ZEAXAN 250MG-90MG
25 CAPSULE ORAL DAILY
COMMUNITY

## 2024-05-06 RX ORDER — PROCHLORPERAZINE EDISYLATE 5 MG/ML
10 INJECTION INTRAMUSCULAR; INTRAVENOUS EVERY 6 HOURS PRN
Status: DISCONTINUED | OUTPATIENT
Start: 2024-05-06 | End: 2024-05-06 | Stop reason: HOSPADM

## 2024-05-06 RX ORDER — GLUCOSAMINE/CHONDRO SU A 500-400 MG
2 TABLET ORAL DAILY
COMMUNITY

## 2024-05-06 RX ORDER — GLUCOSAMINE/CHONDR SU A SOD 750-600 MG
1 TABLET ORAL DAILY
COMMUNITY

## 2024-05-06 RX ORDER — BISMUTH SUBSALICYLATE 262 MG
1 TABLET,CHEWABLE ORAL DAILY
COMMUNITY

## 2024-05-06 RX ORDER — TURMERIC ROOT EXTRACT 500 MG
1 TABLET ORAL DAILY
COMMUNITY

## 2024-05-06 RX ORDER — FAMOTIDINE 10 MG/ML
20 INJECTION INTRAVENOUS ONCE AS NEEDED
Status: DISCONTINUED | OUTPATIENT
Start: 2024-05-06 | End: 2024-05-06 | Stop reason: HOSPADM

## 2024-05-06 RX ORDER — VITAMIN E MIXED 400 UNIT
400 CAPSULE ORAL DAILY
COMMUNITY

## 2024-05-06 RX ORDER — PROCHLORPERAZINE MALEATE 10 MG
10 TABLET ORAL EVERY 6 HOURS PRN
Status: DISCONTINUED | OUTPATIENT
Start: 2024-05-06 | End: 2024-05-06 | Stop reason: HOSPADM

## 2024-05-06 RX ORDER — DIPHENHYDRAMINE HYDROCHLORIDE 50 MG/ML
50 INJECTION INTRAMUSCULAR; INTRAVENOUS AS NEEDED
Status: DISCONTINUED | OUTPATIENT
Start: 2024-05-06 | End: 2024-05-06 | Stop reason: HOSPADM

## 2024-05-06 RX ORDER — EPINEPHRINE 0.3 MG/.3ML
0.3 INJECTION SUBCUTANEOUS EVERY 5 MIN PRN
Status: DISCONTINUED | OUTPATIENT
Start: 2024-05-06 | End: 2024-05-06 | Stop reason: HOSPADM

## 2024-05-06 RX ORDER — HEPARIN 100 UNIT/ML
500 SYRINGE INTRAVENOUS AS NEEDED
OUTPATIENT
Start: 2024-05-06

## 2024-05-06 RX ORDER — ASHWAGANDHA ROOT EXTRACT 300 MG
1 TABLET ORAL DAILY
COMMUNITY

## 2024-05-06 RX ORDER — ASPIRIN 325 MG
100 TABLET, DELAYED RELEASE (ENTERIC COATED) ORAL DAILY
COMMUNITY

## 2024-05-06 RX ORDER — GARLIC 500 MG
1 CAPSULE ORAL DAILY
COMMUNITY

## 2024-05-06 RX ORDER — ALBUTEROL SULFATE 0.83 MG/ML
3 SOLUTION RESPIRATORY (INHALATION) AS NEEDED
Status: DISCONTINUED | OUTPATIENT
Start: 2024-05-06 | End: 2024-05-06 | Stop reason: HOSPADM

## 2024-05-06 RX ORDER — HEPARIN SODIUM,PORCINE/PF 10 UNIT/ML
50 SYRINGE (ML) INTRAVENOUS AS NEEDED
OUTPATIENT
Start: 2024-05-06

## 2024-05-06 RX ORDER — FERROUS SULFATE, DRIED 160(50) MG
1 TABLET, EXTENDED RELEASE ORAL DAILY
COMMUNITY

## 2024-05-06 RX ADMIN — PERTUZUMAB, TRASTUZUMAB, AND HYALURONIDASE-ZZXF 10 ML: 1200; 600; 2000 INJECTION, SOLUTION SUBCUTANEOUS at 10:49

## 2024-05-06 ASSESSMENT — PAIN SCALES - GENERAL: PAINLEVEL: 0-NO PAIN

## 2024-05-06 NOTE — TELEPHONE ENCOUNTER
Spoke with Carolin to notify that Dr. Whaley stated it is okay to have routine dental cleaning next week.

## 2024-05-15 ENCOUNTER — APPOINTMENT (OUTPATIENT)
Dept: HEMATOLOGY/ONCOLOGY | Facility: CLINIC | Age: 74
End: 2024-05-15
Payer: COMMERCIAL

## 2024-05-16 ENCOUNTER — APPOINTMENT (OUTPATIENT)
Dept: CARDIOLOGY | Facility: CLINIC | Age: 74
End: 2024-05-16
Payer: MEDICARE

## 2024-05-20 DIAGNOSIS — I51.89 MILD LEFT VENTRICULAR SYSTOLIC DYSFUNCTION (LVSD): ICD-10-CM

## 2024-05-20 RX ORDER — METOPROLOL SUCCINATE 25 MG/1
25 TABLET, EXTENDED RELEASE ORAL
Qty: 90 TABLET | Refills: 3 | Status: SHIPPED | OUTPATIENT
Start: 2024-05-20 | End: 2025-05-20

## 2024-05-20 RX ORDER — LOSARTAN POTASSIUM 25 MG/1
25 TABLET ORAL DAILY
Qty: 90 TABLET | Refills: 3 | Status: SHIPPED | OUTPATIENT
Start: 2024-05-20 | End: 2025-05-20

## 2024-05-28 ENCOUNTER — OFFICE VISIT (OUTPATIENT)
Dept: HEMATOLOGY/ONCOLOGY | Facility: CLINIC | Age: 74
End: 2024-05-28
Payer: MEDICARE

## 2024-05-28 ENCOUNTER — INFUSION (OUTPATIENT)
Dept: HEMATOLOGY/ONCOLOGY | Facility: CLINIC | Age: 74
End: 2024-05-28
Payer: MEDICARE

## 2024-05-28 VITALS
SYSTOLIC BLOOD PRESSURE: 144 MMHG | DIASTOLIC BLOOD PRESSURE: 91 MMHG | TEMPERATURE: 97.3 F | HEART RATE: 103 BPM | BODY MASS INDEX: 30.21 KG/M2 | OXYGEN SATURATION: 96 % | RESPIRATION RATE: 18 BRPM | WEIGHT: 209.6 LBS

## 2024-05-28 DIAGNOSIS — Z17.0 MALIGNANT NEOPLASM OF CENTRAL PORTION OF RIGHT BREAST IN FEMALE, ESTROGEN RECEPTOR POSITIVE (MULTI): ICD-10-CM

## 2024-05-28 DIAGNOSIS — Z17.0 MALIGNANT NEOPLASM OF CENTRAL PORTION OF RIGHT BREAST IN FEMALE, ESTROGEN RECEPTOR POSITIVE (MULTI): Primary | ICD-10-CM

## 2024-05-28 DIAGNOSIS — C50.111 MALIGNANT NEOPLASM OF CENTRAL PORTION OF RIGHT BREAST IN FEMALE, ESTROGEN RECEPTOR POSITIVE (MULTI): Primary | ICD-10-CM

## 2024-05-28 DIAGNOSIS — C50.111 MALIGNANT NEOPLASM OF CENTRAL PORTION OF RIGHT BREAST IN FEMALE, ESTROGEN RECEPTOR POSITIVE (MULTI): ICD-10-CM

## 2024-05-28 PROCEDURE — 1160F RVW MEDS BY RX/DR IN RCRD: CPT | Performed by: STUDENT IN AN ORGANIZED HEALTH CARE EDUCATION/TRAINING PROGRAM

## 2024-05-28 PROCEDURE — 1159F MED LIST DOCD IN RCRD: CPT | Performed by: STUDENT IN AN ORGANIZED HEALTH CARE EDUCATION/TRAINING PROGRAM

## 2024-05-28 PROCEDURE — 2500000004 HC RX 250 GENERAL PHARMACY W/ HCPCS (ALT 636 FOR OP/ED): Mod: JZ,JG | Performed by: STUDENT IN AN ORGANIZED HEALTH CARE EDUCATION/TRAINING PROGRAM

## 2024-05-28 PROCEDURE — 1036F TOBACCO NON-USER: CPT | Performed by: STUDENT IN AN ORGANIZED HEALTH CARE EDUCATION/TRAINING PROGRAM

## 2024-05-28 PROCEDURE — 99214 OFFICE O/P EST MOD 30 MIN: CPT | Performed by: STUDENT IN AN ORGANIZED HEALTH CARE EDUCATION/TRAINING PROGRAM

## 2024-05-28 PROCEDURE — 1126F AMNT PAIN NOTED NONE PRSNT: CPT | Performed by: STUDENT IN AN ORGANIZED HEALTH CARE EDUCATION/TRAINING PROGRAM

## 2024-05-28 PROCEDURE — 96401 CHEMO ANTI-NEOPL SQ/IM: CPT

## 2024-05-28 RX ORDER — PROCHLORPERAZINE MALEATE 5 MG
10 TABLET ORAL EVERY 6 HOURS PRN
OUTPATIENT
Start: 2024-07-08

## 2024-05-28 RX ORDER — FAMOTIDINE 10 MG/ML
20 INJECTION INTRAVENOUS ONCE AS NEEDED
OUTPATIENT
Start: 2024-07-08

## 2024-05-28 RX ORDER — PROCHLORPERAZINE EDISYLATE 5 MG/ML
10 INJECTION INTRAMUSCULAR; INTRAVENOUS EVERY 6 HOURS PRN
OUTPATIENT
Start: 2024-07-08

## 2024-05-28 RX ORDER — DIPHENHYDRAMINE HYDROCHLORIDE 50 MG/ML
50 INJECTION INTRAMUSCULAR; INTRAVENOUS AS NEEDED
OUTPATIENT
Start: 2024-06-17

## 2024-05-28 RX ORDER — FAMOTIDINE 10 MG/ML
20 INJECTION INTRAVENOUS ONCE AS NEEDED
OUTPATIENT
Start: 2024-06-17

## 2024-05-28 RX ORDER — DIPHENHYDRAMINE HYDROCHLORIDE 50 MG/ML
50 INJECTION INTRAMUSCULAR; INTRAVENOUS AS NEEDED
OUTPATIENT
Start: 2024-07-08

## 2024-05-28 RX ORDER — EPINEPHRINE 0.3 MG/.3ML
0.3 INJECTION SUBCUTANEOUS EVERY 5 MIN PRN
Status: DISCONTINUED | OUTPATIENT
Start: 2024-05-28 | End: 2024-05-28 | Stop reason: HOSPADM

## 2024-05-28 RX ORDER — ALBUTEROL SULFATE 0.83 MG/ML
3 SOLUTION RESPIRATORY (INHALATION) AS NEEDED
Status: DISCONTINUED | OUTPATIENT
Start: 2024-05-28 | End: 2024-05-28 | Stop reason: HOSPADM

## 2024-05-28 RX ORDER — ALBUTEROL SULFATE 0.83 MG/ML
3 SOLUTION RESPIRATORY (INHALATION) AS NEEDED
OUTPATIENT
Start: 2024-07-08

## 2024-05-28 RX ORDER — FAMOTIDINE 10 MG/ML
20 INJECTION INTRAVENOUS ONCE AS NEEDED
Status: DISCONTINUED | OUTPATIENT
Start: 2024-05-28 | End: 2024-05-28 | Stop reason: HOSPADM

## 2024-05-28 RX ORDER — PROCHLORPERAZINE EDISYLATE 5 MG/ML
10 INJECTION INTRAMUSCULAR; INTRAVENOUS EVERY 6 HOURS PRN
Status: DISCONTINUED | OUTPATIENT
Start: 2024-05-28 | End: 2024-05-28 | Stop reason: HOSPADM

## 2024-05-28 RX ORDER — DIPHENHYDRAMINE HYDROCHLORIDE 50 MG/ML
50 INJECTION INTRAMUSCULAR; INTRAVENOUS AS NEEDED
Status: DISCONTINUED | OUTPATIENT
Start: 2024-05-28 | End: 2024-05-28 | Stop reason: HOSPADM

## 2024-05-28 RX ORDER — EPINEPHRINE 0.3 MG/.3ML
0.3 INJECTION SUBCUTANEOUS EVERY 5 MIN PRN
OUTPATIENT
Start: 2024-07-08

## 2024-05-28 RX ORDER — ALBUTEROL SULFATE 0.83 MG/ML
3 SOLUTION RESPIRATORY (INHALATION) AS NEEDED
OUTPATIENT
Start: 2024-06-17

## 2024-05-28 RX ORDER — EPINEPHRINE 0.3 MG/.3ML
0.3 INJECTION SUBCUTANEOUS EVERY 5 MIN PRN
OUTPATIENT
Start: 2024-06-17

## 2024-05-28 RX ORDER — PROCHLORPERAZINE MALEATE 5 MG
10 TABLET ORAL EVERY 6 HOURS PRN
OUTPATIENT
Start: 2024-06-17

## 2024-05-28 RX ORDER — PROCHLORPERAZINE MALEATE 10 MG
10 TABLET ORAL EVERY 6 HOURS PRN
Status: DISCONTINUED | OUTPATIENT
Start: 2024-05-28 | End: 2024-05-28 | Stop reason: HOSPADM

## 2024-05-28 RX ORDER — PROCHLORPERAZINE EDISYLATE 5 MG/ML
10 INJECTION INTRAMUSCULAR; INTRAVENOUS EVERY 6 HOURS PRN
OUTPATIENT
Start: 2024-06-17

## 2024-05-28 RX ADMIN — PERTUZUMAB, TRASTUZUMAB, AND HYALURONIDASE-ZZXF 5 ML: 600; 600; 2000 INJECTION, SOLUTION SUBCUTANEOUS at 11:05

## 2024-05-28 ASSESSMENT — PAIN SCALES - GENERAL: PAINLEVEL: 0-NO PAIN

## 2024-05-28 NOTE — PROGRESS NOTES
PT seen in infusion after MD visit;r eceived ordered injection without incident. Please see provider note for assessment.

## 2024-05-28 NOTE — PROGRESS NOTES
Patient ID: Carolin Talamantes is a 74 y.o. female.    The patient presents to clinic today for her history of breast cancer.     Cancer Staging   Malignant neoplasm of central portion of right breast in female, estrogen receptor positive (Multi)  Staging form: Breast, AJCC 8th Edition  - Clinical: Stage IIB (cT3, cN1(f), cM0, G3, ER+, UT+, HER2+) - Signed by Morena Garrido DO on 4/23/2024        Diagnostic/Therapeutic History:    - 02/2024: breast pain followed by feeling a breast mass in her right breast    - 03/26/2024: follow up imaging on 03/26/2024 showed mass measuring 4cm at 12 oclock, 6 CFN    - 04/09/2024: US right breast showed a 5.1 cm mass at 11 oclock 5 CFN with at least 3 morphologically abnormal LN    - 04/09/2024: Right breast mass US guided core needle biopsy showed IDC G3 ER: 95%, UT: 20%, HER2 3+  with right axilla positive for metastatic carcinoma    -04/23/2024: 6 mm RLL nodule for which follow up is needed. No distant disease. Bone scan was negative.     - 05/06/2024: cycle 1 of phesgo and Anatrozole    History of Present Illness (HPI)/Interval History:  Accompanied by her  today. Did have shooting pain at site of injection 2 weeks after injection    Feels mass is smaller    She denies any fevers or chills.    She denies any chest pain or breathing issues.    Intermittent headaches, on the left side    She denies any new or unexplained bone aches or pains.    She denies any skin lesions or masses, hair loss, nail changes, or oral sores.    She reports a normal appetite and normal bowel movements. Her weight is stable.     Energy is good    PMH: Afib on eliquis and metoprolol    PSH: No surgery    Allergies: no allergies, GI intolerence to erythromycin    Social hx: no smoking, no alcohol    Family hx: no hx of cancer    Menarche at 15, menopause: 57, OCP x1 years,  No HRT      Review of Systems:  14-point ROS otherwise negative, as per HPI.    History reviewed. No pertinent past medical  history.    History reviewed. No pertinent surgical history.    Social History     Socioeconomic History    Marital status:      Spouse name: None    Number of children: None    Years of education: None    Highest education level: None   Occupational History    None   Tobacco Use    Smoking status: Never    Smokeless tobacco: Never   Substance and Sexual Activity    Alcohol use: None    Drug use: None    Sexual activity: None   Other Topics Concern    None   Social History Narrative    None     Social Determinants of Health     Financial Resource Strain: Not on file   Food Insecurity: Not on file   Transportation Needs: Not on file   Physical Activity: Not on file   Stress: Not on file   Social Connections: Not on file   Intimate Partner Violence: Not on file   Housing Stability: Not on file       Allergies   Allergen Reactions    Erythromycin GI Upset     Other reaction(s): GI Upset         Current Outpatient Medications:     anastrozole (Arimidex) 1 mg tablet, Take 1 tablet (1 mg total) by mouth once daily.  Swallow whole with a drink of water., Disp: 90 tablet, Rfl: 3    apixaban (Eliquis) 5 mg tablet, Take 1 tablet (5 mg) by mouth twice a day., Disp: , Rfl:     ashwagandha root extract 300 mg tablet, Take 1 tablet by mouth once daily., Disp: , Rfl:     calcium carbonate-vitamin D3 (Oyster Shell Calcium-Vit D3) 500 mg-5 mcg (200 unit) tablet, Take 1 tablet by mouth once daily., Disp: , Rfl:     cholecalciferol (Vitamin D-3) 25 MCG (1000 UT) capsule, Take 1 capsule (25 mcg) by mouth once daily., Disp: , Rfl:     co-enzyme Q-10 50 mg capsule, Take 2 capsules (100 mg) by mouth once daily., Disp: , Rfl:     garlic 500 mg capsule, Take 1 tablet by mouth once daily., Disp: , Rfl:     ginkgo biloba leaf extract 60 mg tablet, Take 1 tablet by mouth once daily., Disp: , Rfl:     glucosamine-chondroitin 500-400 mg tablet, Take 2 tablets by mouth once daily., Disp: , Rfl:     losartan (Cozaar) 25 mg tablet, Take 1  tablet (25 mg) by mouth once daily., Disp: 90 tablet, Rfl: 3    metoprolol succinate XL (Toprol-XL) 25 mg 24 hr tablet, Take 1 tablet (25 mg) by mouth once daily., Disp: 90 tablet, Rfl: 3    multivitamin tablet, Take 1 tablet by mouth once daily., Disp: , Rfl:     turmeric root extract 500 mg tablet, Take 1 tablet by mouth once daily., Disp: , Rfl:     UNABLE TO FIND, Take 1 tablet by mouth once daily. Magnesium 250mg, Disp: , Rfl:     UNABLE TO FIND, Take 1 tablet by mouth once daily. Fish Oil 1000mg, Disp: , Rfl:     UNABLE TO FIND, Take 1 tablet by mouth once daily. Susan Root, Disp: , Rfl:     vitamin E 180 mg (400 unit) capsule, Take 1 capsule (400 Units) by mouth once daily., Disp: , Rfl:      Objective    BSA: 2.16 meters squared  BP (!) 144/91 (BP Location: Left arm, Patient Position: Sitting)   Pulse 103   Temp 36.3 °C (97.3 °F) (Temporal)   Resp 18   Wt 95.1 kg (209 lb 9.6 oz)   SpO2 96%   BMI 30.21 kg/m²     ECOG Performance Status: 1    Physical Exam    GA: alert, oriented, cooperative  HEENT: anicteric sclera, well injected conjunctiva  Heart: RRR, no murmurs or gallops heard  Lung: CTAB  Abd: +BS, soft, non tender abdomen  Ext: peripheral pulses positive  Right breast: 5 cm mass not extending to chest wall, mild erythema    Laboratory Data:  Lab Results   Component Value Date    WBC 6.2 04/23/2024    HGB 13.5 04/23/2024    HCT 41.3 04/23/2024    MCV 91 04/23/2024     04/23/2024       Chemistry    Lab Results   Component Value Date/Time     04/23/2024 1342    K 4.3 04/23/2024 1342     04/23/2024 1342    CO2 28 04/23/2024 1342    BUN 23 04/23/2024 1342    CREATININE 1.07 (H) 04/23/2024 1342    Lab Results   Component Value Date/Time    CALCIUM 9.3 04/23/2024 1342    ALKPHOS 92 04/23/2024 1342    AST 24 04/23/2024 1342    ALT 21 04/23/2024 1342    BILITOT 0.7 04/23/2024 1342             Radiology:  ECG 12 Lead  Atrial fibrillation  Low voltage QRS  Nonspecific ST and T wave  abnormality  Abnormal ECG  When compared with ECG of 14-DEC-2023 10:45,  ST no longer depressed in Inferior leads  T wave inversion no longer evident in Inferior leads  Confirmed by Pepito Greco ([id number]) on 4/30/2024 10:32:51 PM       BI US breast limited right 04/09/2024    Narrative  Interpreted By:  Bernardo Damon,  STUDY:  BI US BREAST LIMITED RIGHT;  4/9/2024 2:03 pm    ACCESSION NUMBER(S):  [id number]    ORDERING CLINICIAN:  DAVID MCCRAY    INDICATION:  Repeat sonographic evaluation of the right breast and axilla was  performed prior to biopsy. Initially, patient declined repeat  mammogram, however she agreed that additional mammographic views  would be useful during her biopsy procedure in these were done during  her post clip imaging.    COMPARISON:  03/26/2024, 03/22/2024    FINDINGS:  Targeted ultrasound was performed of the right breast by a registered  sonographer with elastography. In the right breast at 11 o'clock, 5  cm from the nipple, in the patient's area of palpable concern, there  is an irregular hypoechoic mass containing internal calcifications  measuring at least 5.1 x 2.5 x 4.2 cm. This is highly concerning for  malignancy. Scanning of the right axilla demonstrates at least 3  morphologically abnormal lymph nodes with cortical thickening.    Impression  Highly suspicious right breast mass with right axillary  lymphadenopathy. Biopsy will be subsequently performed. Additional  mammographic images were obtained at the time of post clip imaging.    BI-RADS CATEGORY:    BI-RADS CATEGORY:  5 Highly Suggestive of Malignancy.  Recommendation:  Surgical Consultation and Biopsy.  Recommended Date:  Immediate.  Laterality:  Right.    For any future breast imaging appointments, please call [phone]).      MACRO:  Bernardo Damon discussed the significance and urgency of this critical  finding in person with  DAVID SHAZIA on 4/9/2024 at 1 pm.  (**-RCF-**) Findings:  See findings.    Signed by:  Bernardo Damon 4/9/2024 5:14 PM  Dictation workstation:   [id number]          Assessment/Plan:    74 year old female patient with hx of Afib on metoprolol and eliquis with newly diagnosed HER2 positive BC presenting to discuss treatment options    - 02/2024: breast pain followed by feeling a breast mass in her right breast    - 03/26/2024: follow up imaging on 03/26/2024 showed mass measuring 4cm at 12 oclock, 6 CFN    - 04/09/2024: US right breast showed a 5.1 cm mass at 11 oclock 5 CFN with at least 3 morphologically abnormal LN    - 04/09/2024: Right breast mass US guided core needle biopsy showed IDC G3 ER: 95%, MA: 20%, HER2 3+  with right axilla positive for metastatic carcinoma     I reviewed with her the events that led to her diagnosis of breast cancer. We reviewed all the procedures and diagnostic imaging she underwent thus far. I discussed the features of her breast cancer that include the size, grade, lymph node status and  hormone receptor/ her2-bobby status. Per NCCN guidelines, for HER2 positive breast ca more than 2cm and/or +LN, the current recommendation would be neoadjuvant treatment with dual HER2  blockade as well as chemotherapy. The goal of treatment would be to  downstage her cancer as well as to assess tumor chemosensitivity (whether or not a pathologic complete response is achieved or not). Carolin does not want to pursue chemotherapy and we agreed on doing dual HER2 blockade only with pertuzumab and trastuzumab, with plan for 6 cycles followed by surgery.     Staging scans showed no distant disease  05/06/2024: cycle 1 of phesgo and Anastrozole  Mild LV dysfunction 45-50%, long standing Afib: 24 hr Holter recommended. Follows up with Dr Alonso on 05/30  Okay to proceed with cycle 2 of phesgo today    At least 35 minutes of direct consultation was spent reviewing the patient's chart as well as discussing and  reviewing the  cancer care plan including educating and answering questions and  concerns, greater than 50 percent spent in counseling and coordination of care.          Cassy Whaley MD  Hematology and Medical Oncology  [facility]

## 2024-05-30 ENCOUNTER — OFFICE VISIT (OUTPATIENT)
Dept: CARDIOLOGY | Facility: CLINIC | Age: 74
End: 2024-05-30
Payer: MEDICARE

## 2024-05-30 VITALS
SYSTOLIC BLOOD PRESSURE: 122 MMHG | DIASTOLIC BLOOD PRESSURE: 76 MMHG | OXYGEN SATURATION: 99 % | WEIGHT: 209 LBS | BODY MASS INDEX: 30.96 KG/M2 | HEIGHT: 69 IN | HEART RATE: 90 BPM

## 2024-05-30 DIAGNOSIS — Z17.0 MALIGNANT NEOPLASM OF CENTRAL PORTION OF RIGHT BREAST IN FEMALE, ESTROGEN RECEPTOR POSITIVE (MULTI): ICD-10-CM

## 2024-05-30 DIAGNOSIS — I48.11 LONGSTANDING PERSISTENT ATRIAL FIBRILLATION (MULTI): Primary | ICD-10-CM

## 2024-05-30 DIAGNOSIS — C50.111 MALIGNANT NEOPLASM OF CENTRAL PORTION OF RIGHT BREAST IN FEMALE, ESTROGEN RECEPTOR POSITIVE (MULTI): ICD-10-CM

## 2024-05-30 DIAGNOSIS — Z79.899 ENCOUNTER FOR MONITORING CARDIOTOXIC DRUG THERAPY: ICD-10-CM

## 2024-05-30 DIAGNOSIS — Z51.81 ENCOUNTER FOR MONITORING CARDIOTOXIC DRUG THERAPY: ICD-10-CM

## 2024-05-30 PROCEDURE — 1036F TOBACCO NON-USER: CPT | Performed by: STUDENT IN AN ORGANIZED HEALTH CARE EDUCATION/TRAINING PROGRAM

## 2024-05-30 PROCEDURE — 1160F RVW MEDS BY RX/DR IN RCRD: CPT | Performed by: STUDENT IN AN ORGANIZED HEALTH CARE EDUCATION/TRAINING PROGRAM

## 2024-05-30 PROCEDURE — 1159F MED LIST DOCD IN RCRD: CPT | Performed by: STUDENT IN AN ORGANIZED HEALTH CARE EDUCATION/TRAINING PROGRAM

## 2024-05-30 PROCEDURE — 99205 OFFICE O/P NEW HI 60 MIN: CPT | Performed by: STUDENT IN AN ORGANIZED HEALTH CARE EDUCATION/TRAINING PROGRAM

## 2024-05-30 NOTE — PROGRESS NOTES
"Subjective   Carolin Talamantes is a 74 y.o. female with malignant neoplasm of the right breast, stage IIb.  Follows with Dr. Whaley-Currently on treatment with pertuzumab/trastuzumab plan for 6 cycles followed by surgery.  Echocardiogram dated July 20, 2023 notes severe left atrial dilatation, mildly decreased left ventricular ejection fraction 45 to 50%.  Patient also has a history of atrial fibrillation.    On this visit patient denies any chest discomfort or shortness of breath.  Denies any orthopnea/PND/lower extremity edema.  Does not have any bleeding complications from the Eliquis.  Notably has had atrial fibrillation since 2009 (was paroxysmal initially; now appears to be longstanding persistent)    Review of Systems  A comprehensive review of systems was negative.     Past medical history: Atrial fibrillation, low ejection fraction, breast cancer  History reviewed. No pertinent surgical history.    Allergies   Allergen Reactions    Erythromycin GI Upset     Other reaction(s): GI Upset     No family history on file.    Objective   Visit Vitals  /76   Pulse 90   Ht 1.753 m (5' 9\")   Wt 94.8 kg (209 lb)   SpO2 99%   BMI 30.86 kg/m²   OB Status Postmenopausal   Smoking Status Never   BSA 2.15 m²     General: awake, alert and oriented. No acute distress.   Skin: Skin is warm, dry and intact without rashes or lesions. Appropriate color for ethnicity. Nail beds pink with no cyanosis or clubbing  HEENT: normocephalic, atraumatic; conjunctivae are clear without exudates or hemorrhage. Sclera is non-icteric. Eyelids are normal in appearance without swelling or lesions. Hearing intact. Nares are patent bilaterally. Moist mucous membranes.   Cardiovascular: Regular. No murmurs, gallops, or rubs are auscultated. S1 and S2 are heard and are of normal intensity. No JVD, no carotid bruits  Respiratory: Thorax symmetric. CTAB, breath sounds vesicular. No crackles, wheezes or ronchi.   Gastrointestinal: soft, " "non-distended, BS + x 4  Genitourinary: exam deferred  Musculoskeletal: moves all extremities  Extremities: pulses palpable bilaterally; no swelling or erythema; no edema  Neurological: alert & oriented x 3; no focal deficits  Psychiatric: appropriate mood and affect    Current Outpatient Medications   Medication Instructions    anastrozole (ARIMIDEX) 1 mg, oral, Daily, Swallow whole with a drink of water.    apixaban (ELIQUIS) 5 mg, oral, 2 times daily    ashwagandha root extract 300 mg tablet 1 tablet, oral, Daily    calcium carbonate-vitamin D3 (Oyster Shell Calcium-Vit D3) 500 mg-5 mcg (200 unit) tablet 1 tablet, oral, Daily    cholecalciferol (VITAMIN D-3) 25 mcg, oral, Daily    co-enzyme Q-10 100 mg, oral, Daily    garlic 500 mg capsule 1 tablet, oral, Daily    ginkgo biloba leaf extract 60 mg tablet 1 tablet, oral, Daily    glucosamine-chondroitin 500-400 mg tablet 2 tablets, oral, Daily    losartan (COZAAR) 25 mg, oral, Daily    metoprolol succinate XL (TOPROL-XL) 25 mg, oral, Daily RT    multivitamin tablet 1 tablet, oral, Daily    turmeric root extract 500 mg tablet 1 tablet, oral, Daily    UNABLE TO FIND 1 tablet, oral, Daily, Magnesium 250mg    UNABLE TO FIND 1 tablet, oral, Daily, Fish Oil 1000mg    UNABLE TO FIND 1 tablet, oral, Daily, Susan Root    vitamin E 400 Units, oral, Daily       Lab Review   Lab Results   Component Value Date    HGB 13.5 04/23/2024     04/23/2024    WBC 6.2 04/23/2024     04/23/2024    K 4.3 04/23/2024    CREATININE 1.07 (H) 04/23/2024    BUN 23 04/23/2024    CALCIUM 9.3 04/23/2024    INR 1.1 04/23/2024    TROPHS 3 05/06/2024        Assessment/Plan   Cardiac issues include:    Plan treatment with cardiotoxic chemotherapy  -Is due to receive trastuzumab based chemotherapy.  Given low ejection fraction/atrial fibrillation at baseline I asked her to keep an eye out for \"red flag\" symptoms and call us in case she develops any chest discomfort/shortness of " "breath/lower extremity edema  -Will check echocardiograms every 2 cycles.  Has had 2 cycles now; echocardiogram pending this coming Monday; next echocardiogram will be in 6 weeks followed by an in person visit    Heart failure with midrange ejection fraction  -Currently on minimal GDMT with Toprol, losartan 25 mg daily  -\"Warm and dry\" on exam.  EF likely appears artifactually low due to existing atrial fibrillation.  Will continue to monitor on current therapy    Atrial fibrillation  -Rate control seems an appropriate strategy in the septuagenarian with minimal symptoms  -Continue Eliquis for now    RTC 6 weeks with echo as mentioned above     Bob Johnson MD  Advanced Heart Failure/Transplant Cardiology  Cardio-Oncology  [facility]   "

## 2024-05-31 ENCOUNTER — TELEPHONE (OUTPATIENT)
Dept: ADMISSION | Facility: HOSPITAL | Age: 74
End: 2024-05-31
Payer: MEDICARE

## 2024-05-31 NOTE — TELEPHONE ENCOUNTER
Carolin called and said she wants to know if she needs get labs on Thursday 6/13 for her Monday 6/17 infusion appointment? Also, please place lab orders needed and she said she gets them at [facility] and she was told by [facility] she needs orders faxed to them at [phone]. She also needed some appointments changed in June and July so transferred her to scheduling. Messaged team.

## 2024-05-31 NOTE — TELEPHONE ENCOUNTER
Luisa,   Thanks. Is she good to get labs Thursday then for the Monday appointment? Is it now 7 days prior? Just confirming before I call her back.

## 2024-05-31 NOTE — TELEPHONE ENCOUNTER
Patient called back in to see if her orders have been faxed and states she has a couple of more lab questions and asked to speak with RN partner.  Secure Chat sent to RN partner. Awaiting response.   Call was disconnected; I did try calling patient back but she did not answer.

## 2024-05-31 NOTE — TELEPHONE ENCOUNTER
I left V/M with her permission earlier she is good to get labs done on Thursday 6/13 for her 6/17 appointment since within 7 day window.

## 2024-06-04 ENCOUNTER — NURSE TRIAGE (OUTPATIENT)
Dept: HEMATOLOGY/ONCOLOGY | Facility: HOSPITAL | Age: 74
End: 2024-06-04
Payer: MEDICARE

## 2024-06-04 DIAGNOSIS — Z17.0 MALIGNANT NEOPLASM OF CENTRAL PORTION OF RIGHT BREAST IN FEMALE, ESTROGEN RECEPTOR POSITIVE (MULTI): Primary | ICD-10-CM

## 2024-06-04 DIAGNOSIS — C50.111 MALIGNANT NEOPLASM OF CENTRAL PORTION OF RIGHT BREAST IN FEMALE, ESTROGEN RECEPTOR POSITIVE (MULTI): Primary | ICD-10-CM

## 2024-06-04 NOTE — TELEPHONE ENCOUNTER
Pt has been experiencing intermittent cramping in her LE's:  calf, ankle, foot, knees. Only occurs at night.   She gets relief from heating pad.  She remains able to complete ADL's independently.    Yesterday, she was lying down and experienced an unintentional lateral movement of her jaw.  This happened about 4 times within a 30 minute period.  No recurrence since then.  She did not have any associated pain, unilateral weakness or difficulty chewing/swallowing.  She will continue to monitor and will call back to report and new or worsening symptoms.  Additional Information   Commented on: Please tell me more about the problem you are having. the more detail you provide, the better.     Pt has been experiencing intermittent cramping in her LE's:  calf, ankle, foot, knees. Only occurs at night  Yesterday, she was lying down and experienced an unintentional lateral movement of her jaw.  This happened about 4 times within a 30 minute period.  No recurrence since then.  She did not have any associated pain, unilateral weakness or difficulty chewing/swallowing.   Commented on: What helps these problems?     Heat helps leg cramps    Protocols used: Other

## 2024-06-05 NOTE — TELEPHONE ENCOUNTER
Per Dr. Whaley, these symptoms could be related to anastrozole.  Since pt has not had a recurrence since initial presentation 6/2, she can continue anastrozole for now and should call back if symptoms return.

## 2024-06-13 ENCOUNTER — LAB (OUTPATIENT)
Dept: LAB | Facility: LAB | Age: 74
End: 2024-06-13
Payer: MEDICARE

## 2024-06-13 DIAGNOSIS — E78.2 MIXED HYPERLIPIDEMIA: Primary | ICD-10-CM

## 2024-06-13 DIAGNOSIS — Z11.59 ENCOUNTER FOR SCREENING FOR OTHER VIRAL DISEASES: ICD-10-CM

## 2024-06-13 DIAGNOSIS — C50.111 MALIGNANT NEOPLASM OF CENTRAL PORTION OF RIGHT BREAST IN FEMALE, ESTROGEN RECEPTOR POSITIVE (MULTI): ICD-10-CM

## 2024-06-13 DIAGNOSIS — Z17.0 MALIGNANT NEOPLASM OF CENTRAL PORTION OF RIGHT BREAST IN FEMALE, ESTROGEN RECEPTOR POSITIVE (MULTI): ICD-10-CM

## 2024-06-13 DIAGNOSIS — R94.6 ABNORMAL RESULTS OF THYROID FUNCTION STUDIES: ICD-10-CM

## 2024-06-13 LAB
ALBUMIN SERPL BCP-MCNC: 4.1 G/DL (ref 3.4–5)
ALP SERPL-CCNC: 88 U/L (ref 33–136)
ALT SERPL W P-5'-P-CCNC: 20 U/L (ref 7–45)
ANION GAP SERPL CALC-SCNC: 9 MMOL/L (ref 10–20)
AST SERPL W P-5'-P-CCNC: 20 U/L (ref 9–39)
BASOPHILS # BLD AUTO: 0.03 X10*3/UL (ref 0–0.1)
BASOPHILS NFR BLD AUTO: 0.6 %
BILIRUB SERPL-MCNC: 0.8 MG/DL (ref 0–1.2)
BUN SERPL-MCNC: 25 MG/DL (ref 6–23)
CALCIUM SERPL-MCNC: 9.1 MG/DL (ref 8.6–10.3)
CHLORIDE SERPL-SCNC: 108 MMOL/L (ref 98–107)
CHOLEST SERPL-MCNC: 203 MG/DL (ref 0–199)
CHOLESTEROL/HDL RATIO: 4.7
CO2 SERPL-SCNC: 28 MMOL/L (ref 21–32)
CREAT SERPL-MCNC: 1.08 MG/DL (ref 0.5–1.05)
EGFRCR SERPLBLD CKD-EPI 2021: 55 ML/MIN/1.73M*2
EOSINOPHIL # BLD AUTO: 0.1 X10*3/UL (ref 0–0.4)
EOSINOPHIL NFR BLD AUTO: 2.1 %
ERYTHROCYTE [DISTWIDTH] IN BLOOD BY AUTOMATED COUNT: 12.8 % (ref 11.5–14.5)
GLUCOSE SERPL-MCNC: 113 MG/DL (ref 74–99)
HCT VFR BLD AUTO: 37.5 % (ref 36–46)
HCV AB SER QL: NONREACTIVE
HDLC SERPL-MCNC: 43 MG/DL
HGB BLD-MCNC: 12.3 G/DL (ref 12–16)
IMM GRANULOCYTES # BLD AUTO: 0.01 X10*3/UL (ref 0–0.5)
IMM GRANULOCYTES NFR BLD AUTO: 0.2 % (ref 0–0.9)
LDLC SERPL CALC-MCNC: 138 MG/DL
LYMPHOCYTES # BLD AUTO: 1.13 X10*3/UL (ref 0.8–3)
LYMPHOCYTES NFR BLD AUTO: 23.2 %
MCH RBC QN AUTO: 29.7 PG (ref 26–34)
MCHC RBC AUTO-ENTMCNC: 32.8 G/DL (ref 32–36)
MCV RBC AUTO: 91 FL (ref 80–100)
MONOCYTES # BLD AUTO: 0.39 X10*3/UL (ref 0.05–0.8)
MONOCYTES NFR BLD AUTO: 8 %
NEUTROPHILS # BLD AUTO: 3.21 X10*3/UL (ref 1.6–5.5)
NEUTROPHILS NFR BLD AUTO: 65.9 %
NON HDL CHOLESTEROL: 160 MG/DL (ref 0–149)
NRBC BLD-RTO: 0 /100 WBCS (ref 0–0)
PLATELET # BLD AUTO: 186 X10*3/UL (ref 150–450)
POTASSIUM SERPL-SCNC: 4.2 MMOL/L (ref 3.5–5.3)
PROT SERPL-MCNC: 6.3 G/DL (ref 6.4–8.2)
RBC # BLD AUTO: 4.14 X10*6/UL (ref 4–5.2)
SODIUM SERPL-SCNC: 141 MMOL/L (ref 136–145)
TRIGL SERPL-MCNC: 109 MG/DL (ref 0–149)
TSH SERPL-ACNC: 1.83 MIU/L (ref 0.44–3.98)
VLDL: 22 MG/DL (ref 0–40)
WBC # BLD AUTO: 4.9 X10*3/UL (ref 4.4–11.3)

## 2024-06-13 PROCEDURE — 36415 COLL VENOUS BLD VENIPUNCTURE: CPT

## 2024-06-13 PROCEDURE — 80061 LIPID PANEL: CPT

## 2024-06-13 PROCEDURE — 84443 ASSAY THYROID STIM HORMONE: CPT

## 2024-06-13 PROCEDURE — 85025 COMPLETE CBC W/AUTO DIFF WBC: CPT

## 2024-06-13 PROCEDURE — 80053 COMPREHEN METABOLIC PANEL: CPT

## 2024-06-13 PROCEDURE — 86803 HEPATITIS C AB TEST: CPT

## 2024-06-14 ENCOUNTER — TELEMEDICINE (OUTPATIENT)
Dept: HEMATOLOGY/ONCOLOGY | Facility: HOSPITAL | Age: 74
End: 2024-06-14
Payer: MEDICARE

## 2024-06-14 DIAGNOSIS — Z17.0 MALIGNANT NEOPLASM OF CENTRAL PORTION OF RIGHT BREAST IN FEMALE, ESTROGEN RECEPTOR POSITIVE (MULTI): Primary | ICD-10-CM

## 2024-06-14 DIAGNOSIS — C50.111 MALIGNANT NEOPLASM OF CENTRAL PORTION OF RIGHT BREAST IN FEMALE, ESTROGEN RECEPTOR POSITIVE (MULTI): Primary | ICD-10-CM

## 2024-06-14 PROCEDURE — 99214 OFFICE O/P EST MOD 30 MIN: CPT | Performed by: STUDENT IN AN ORGANIZED HEALTH CARE EDUCATION/TRAINING PROGRAM

## 2024-06-14 NOTE — PROGRESS NOTES
Patient ID: Carolin Talamantes is a 74 y.o. female.    The patient presents to clinic today for her history of breast cancer.     Cancer Staging   Malignant neoplasm of central portion of right breast in female, estrogen receptor positive (Multi)  Staging form: Breast, AJCC 8th Edition  - Clinical: Stage IIB (cT3, cN1(f), cM0, G3, ER+, VA+, HER2+) - Signed by Morena Garrido DO on 4/23/2024        Diagnostic/Therapeutic History:    - 02/2024: breast pain followed by feeling a breast mass in her right breast    - 03/26/2024: follow up imaging on 03/26/2024 showed mass measuring 4cm at 12 oclock, 6 CFN    - 04/09/2024: US right breast showed a 5.1 cm mass at 11 oclock 5 CFN with at least 3 morphologically abnormal LN    - 04/09/2024: Right breast mass US guided core needle biopsy showed IDC G3 ER: 95%, VA: 20%, HER2 3+  with right axilla positive for metastatic carcinoma    -04/23/2024: 6 mm RLL nodule for which follow up is needed. No distant disease. Bone scan was negative.     - 05/06/2024: cycle 1 of phesgo and Anastrozole    - 05/28/2024: cycle 2 of phesgo    History of Present Illness (HPI)/Interval History:  Virtual visit today. Overall feels well    Did have some epistaxis that resolved    Does have intermittent cramping    Feels mass is smaller    She denies any fevers or chills.    She denies any chest pain or breathing issues.    She reports a normal appetite and normal bowel movements. Her weight is stable.     Energy is good    PMH: Afib on eliquis and metoprolol    PSH: No surgery    Allergies: no allergies, GI intolerence to erythromycin    Social hx: no smoking, no alcohol    Family hx: no hx of cancer    Menarche at 15, menopause: 57, OCP x1 years,  No HRT      Review of Systems:  14-point ROS otherwise negative, as per HPI.    No past medical history on file.    No past surgical history on file.    Social History     Socioeconomic History    Marital status:      Spouse name: Not on file     Number of children: Not on file    Years of education: Not on file    Highest education level: Not on file   Occupational History    Not on file   Tobacco Use    Smoking status: Never    Smokeless tobacco: Never   Substance and Sexual Activity    Alcohol use: Not Currently    Drug use: Never    Sexual activity: Not on file   Other Topics Concern    Not on file   Social History Narrative    Not on file     Social Determinants of Health     Financial Resource Strain: Not on file   Food Insecurity: Not on file   Transportation Needs: Not on file   Physical Activity: Not on file   Stress: Not on file   Social Connections: Not on file   Intimate Partner Violence: Not on file   Housing Stability: Not on file       Allergies   Allergen Reactions    Erythromycin GI Upset     Other reaction(s): GI Upset         Current Outpatient Medications:     anastrozole (Arimidex) 1 mg tablet, Take 1 tablet (1 mg total) by mouth once daily.  Swallow whole with a drink of water., Disp: 90 tablet, Rfl: 3    apixaban (Eliquis) 5 mg tablet, Take 1 tablet (5 mg) by mouth twice a day., Disp: , Rfl:     ashwagandha root extract 300 mg tablet, Take 1 tablet by mouth once daily., Disp: , Rfl:     calcium carbonate-vitamin D3 (Oyster Shell Calcium-Vit D3) 500 mg-5 mcg (200 unit) tablet, Take 1 tablet by mouth once daily., Disp: , Rfl:     cholecalciferol (Vitamin D-3) 25 MCG (1000 UT) capsule, Take 1 capsule (25 mcg) by mouth once daily., Disp: , Rfl:     co-enzyme Q-10 50 mg capsule, Take 2 capsules (100 mg) by mouth once daily., Disp: , Rfl:     garlic 500 mg capsule, Take 1 tablet by mouth once daily., Disp: , Rfl:     ginkgo biloba leaf extract 60 mg tablet, Take 1 tablet by mouth once daily., Disp: , Rfl:     glucosamine-chondroitin 500-400 mg tablet, Take 2 tablets by mouth once daily., Disp: , Rfl:     losartan (Cozaar) 25 mg tablet, Take 1 tablet (25 mg) by mouth once daily., Disp: 90 tablet, Rfl: 3    metoprolol succinate XL (Toprol-XL)  25 mg 24 hr tablet, Take 1 tablet (25 mg) by mouth once daily., Disp: 90 tablet, Rfl: 3    multivitamin tablet, Take 1 tablet by mouth once daily., Disp: , Rfl:     turmeric root extract 500 mg tablet, Take 1 tablet by mouth once daily., Disp: , Rfl:     UNABLE TO FIND, Take 1 tablet by mouth once daily. Magnesium 250mg, Disp: , Rfl:     UNABLE TO FIND, Take 1 tablet by mouth once daily. Fish Oil 1000mg, Disp: , Rfl:     UNABLE TO FIND, Take 1 tablet by mouth once daily. Susan Root, Disp: , Rfl:     vitamin E 180 mg (400 unit) capsule, Take 1 capsule (400 Units) by mouth once daily., Disp: , Rfl:      Objective    BSA: There is no height or weight on file to calculate BSA.  There were no vitals taken for this visit.    ECOG Performance Status: 1    Physical Exam    GA: alert, oriented, cooperative  HEENT: anicteric sclera, well injected conjunctiva  Heart: RRR, no murmurs or gallops heard  Lung: CTAB  Abd: +BS, soft, non tender abdomen  Ext: peripheral pulses positive  Right breast: 5 cm mass not extending to chest wall, mild erythema    Laboratory Data:  Lab Results   Component Value Date    WBC 4.9 06/13/2024    HGB 12.3 06/13/2024    HCT 37.5 06/13/2024    MCV 91 06/13/2024     06/13/2024       Chemistry    Lab Results   Component Value Date/Time     06/13/2024 1157    K 4.2 06/13/2024 1157     (H) 06/13/2024 1157    CO2 28 06/13/2024 1157    BUN 25 (H) 06/13/2024 1157    CREATININE 1.08 (H) 06/13/2024 1157    Lab Results   Component Value Date/Time    CALCIUM 9.1 06/13/2024 1157    ALKPHOS 88 06/13/2024 1157    AST 20 06/13/2024 1157    ALT 20 06/13/2024 1157    BILITOT 0.8 06/13/2024 1157             Radiology:  ECG 12 Lead  Atrial fibrillation  Low voltage QRS  Nonspecific ST and T wave abnormality  Abnormal ECG  When compared with ECG of 14-DEC-2023 10:45,  ST no longer depressed in Inferior leads  T wave inversion no longer evident in Inferior leads  Confirmed by Greco, Pepito ([id number]) on  4/30/2024 10:32:51 PM       BI US breast limited right 04/09/2024    Narrative  Interpreted By:  Bernardo Damon,  STUDY:  BI US BREAST LIMITED RIGHT;  4/9/2024 2:03 pm    ACCESSION NUMBER(S):  [id number]    ORDERING CLINICIAN:  DAVID MCCRAY    INDICATION:  Repeat sonographic evaluation of the right breast and axilla was  performed prior to biopsy. Initially, patient declined repeat  mammogram, however she agreed that additional mammographic views  would be useful during her biopsy procedure in these were done during  her post clip imaging.    COMPARISON:  03/26/2024, 03/22/2024    FINDINGS:  Targeted ultrasound was performed of the right breast by a registered  sonographer with elastography. In the right breast at 11 o'clock, 5  cm from the nipple, in the patient's area of palpable concern, there  is an irregular hypoechoic mass containing internal calcifications  measuring at least 5.1 x 2.5 x 4.2 cm. This is highly concerning for  malignancy. Scanning of the right axilla demonstrates at least 3  morphologically abnormal lymph nodes with cortical thickening.    Impression  Highly suspicious right breast mass with right axillary  lymphadenopathy. Biopsy will be subsequently performed. Additional  mammographic images were obtained at the time of post clip imaging.    BI-RADS CATEGORY:    BI-RADS CATEGORY:  5 Highly Suggestive of Malignancy.  Recommendation:  Surgical Consultation and Biopsy.  Recommended Date:  Immediate.  Laterality:  Right.    For any future breast imaging appointments, please call [phone]).      MACRO:  Bernardo Damon discussed the significance and urgency of this critical  finding in person with  DVAID MCCRAY on 4/9/2024 at 1 pm.  (**-RCF-**) Findings:  See findings.    Signed by: Bernardo Damon 4/9/2024 5:14 PM  Dictation workstation:   [id number]          Assessment/Plan:    74 year old female patient with hx of Afib on metoprolol and eliquis with newly diagnosed HER2 positive BC  presenting to discuss treatment options    - 02/2024: breast pain followed by feeling a breast mass in her right breast    - 03/26/2024: follow up imaging on 03/26/2024 showed mass measuring 4cm at 12 oclock, 6 CFN    - 04/09/2024: US right breast showed a 5.1 cm mass at 11 oclock 5 CFN with at least 3 morphologically abnormal LN    - 04/09/2024: Right breast mass US guided core needle biopsy showed IDC G3 ER: 95%, RI: 20%, HER2 3+  with right axilla positive for metastatic carcinoma     I reviewed with her the events that led to her diagnosis of breast cancer. We reviewed all the procedures and diagnostic imaging she underwent thus far. I discussed the features of her breast cancer that include the size, grade, lymph node status and  hormone receptor/ her2-bobby status. Per NCCN guidelines, for HER2 positive breast ca more than 2cm and/or +LN, the current recommendation would be neoadjuvant treatment with dual HER2  blockade as well as chemotherapy. The goal of treatment would be to  downstage her cancer as well as to assess tumor chemosensitivity (whether or not a pathologic complete response is achieved or not). Carolin does not want to pursue chemotherapy and we agreed on doing dual HER2 blockade only with pertuzumab and trastuzumab, with plan for 6 cycles followed by surgery.     Staging scans showed no distant disease  05/06/2024: cycle 1 of phesgo and Anastrozole  05/28/2024: cycle 2 of phesgo  Mild LV dysfunction 45-50%, long standing Afib: 24 hr Holter recommended. Follows up with Dr Alonso on 05/30  Doing okay, okay to proceed with cycle 3 of phesgo on Monday 06/17    At least 35 minutes of direct consultation was spent reviewing the patient's chart as well as discussing and  reviewing the  cancer care plan including educating and answering questions and concerns, greater than 50 percent spent in counseling and coordination of care.          Cassy Whaley MD  Hematology and Medical Oncology  [facility]

## 2024-06-14 NOTE — PROGRESS NOTES
Patient ID: Carolin Talamantes is a 74 y.o. female.    The patient presents to clinic today for her history of breast cancer.     Cancer Staging   Malignant neoplasm of central portion of right breast in female, estrogen receptor positive (Multi)  Staging form: Breast, AJCC 8th Edition  - Clinical: Stage IIB (cT3, cN1(f), cM0, G3, ER+, TN+, HER2+) - Signed by Morena Garrido DO on 4/23/2024        Diagnostic/Therapeutic History:    - 02/2024: breast pain followed by feeling a breast mass in her right breast    - 03/26/2024: follow up imaging on 03/26/2024 showed mass measuring 4cm at 12 oclock, 6 CFN    - 04/09/2024: US right breast showed a 5.1 cm mass at 11 oclock 5 CFN with at least 3 morphologically abnormal LN    - 04/09/2024: Right breast mass US guided core needle biopsy showed IDC G3 ER: 95%, TN: 20%, HER2 3+  with right axilla positive for metastatic carcinoma    -04/23/2024: 6 mm RLL nodule for which follow up is needed. No distant disease. Bone scan was negative.     - 05/06/2024: cycle 1 of phesgo and Anastrozole    - 05/28/2024: cycle 2 of phesgo    History of Present Illness (HPI)/Interval History:  Virtual visit. Overall doing well     Had some epistaxis    Does mention having cramping, intermittent    Feels mass is smaller    She denies any fevers or chills.    She denies any chest pain or breathing issues.    She denies any new or unexplained bone aches or pains.    She denies any skin lesions or masses, hair loss, nail changes, or oral sores.    She reports a normal appetite and normal bowel movements. Her weight is stable.     Energy is good    PMH: Afib on eliquis and metoprolol    PSH: No surgery    Allergies: no allergies, GI intolerence to erythromycin    Social hx: no smoking, no alcohol    Family hx: no hx of cancer    Menarche at 15, menopause: 57, OCP x1 years,  No HRT      Review of Systems:  14-point ROS otherwise negative, as per HPI.    No past medical history on file.    No past  surgical history on file.    Social History     Socioeconomic History    Marital status:      Spouse name: Not on file    Number of children: Not on file    Years of education: Not on file    Highest education level: Not on file   Occupational History    Not on file   Tobacco Use    Smoking status: Never    Smokeless tobacco: Never   Substance and Sexual Activity    Alcohol use: Not Currently    Drug use: Never    Sexual activity: Not on file   Other Topics Concern    Not on file   Social History Narrative    Not on file     Social Determinants of Health     Financial Resource Strain: Not on file   Food Insecurity: Not on file   Transportation Needs: Not on file   Physical Activity: Not on file   Stress: Not on file   Social Connections: Not on file   Intimate Partner Violence: Not on file   Housing Stability: Not on file       Allergies   Allergen Reactions    Erythromycin GI Upset     Other reaction(s): GI Upset         Current Outpatient Medications:     anastrozole (Arimidex) 1 mg tablet, Take 1 tablet (1 mg total) by mouth once daily.  Swallow whole with a drink of water., Disp: 90 tablet, Rfl: 3    apixaban (Eliquis) 5 mg tablet, Take 1 tablet (5 mg) by mouth twice a day., Disp: , Rfl:     ashwagandha root extract 300 mg tablet, Take 1 tablet by mouth once daily., Disp: , Rfl:     calcium carbonate-vitamin D3 (Oyster Shell Calcium-Vit D3) 500 mg-5 mcg (200 unit) tablet, Take 1 tablet by mouth once daily., Disp: , Rfl:     cholecalciferol (Vitamin D-3) 25 MCG (1000 UT) capsule, Take 1 capsule (25 mcg) by mouth once daily., Disp: , Rfl:     co-enzyme Q-10 50 mg capsule, Take 2 capsules (100 mg) by mouth once daily., Disp: , Rfl:     garlic 500 mg capsule, Take 1 tablet by mouth once daily., Disp: , Rfl:     ginkgo biloba leaf extract 60 mg tablet, Take 1 tablet by mouth once daily., Disp: , Rfl:     glucosamine-chondroitin 500-400 mg tablet, Take 2 tablets by mouth once daily., Disp: , Rfl:     losartan  (Cozaar) 25 mg tablet, Take 1 tablet (25 mg) by mouth once daily., Disp: 90 tablet, Rfl: 3    metoprolol succinate XL (Toprol-XL) 25 mg 24 hr tablet, Take 1 tablet (25 mg) by mouth once daily., Disp: 90 tablet, Rfl: 3    multivitamin tablet, Take 1 tablet by mouth once daily., Disp: , Rfl:     turmeric root extract 500 mg tablet, Take 1 tablet by mouth once daily., Disp: , Rfl:     UNABLE TO FIND, Take 1 tablet by mouth once daily. Magnesium 250mg, Disp: , Rfl:     UNABLE TO FIND, Take 1 tablet by mouth once daily. Fish Oil 1000mg, Disp: , Rfl:     UNABLE TO FIND, Take 1 tablet by mouth once daily. Susan Root, Disp: , Rfl:     vitamin E 180 mg (400 unit) capsule, Take 1 capsule (400 Units) by mouth once daily., Disp: , Rfl:      Objective    BSA: There is no height or weight on file to calculate BSA.  There were no vitals taken for this visit.    ECOG Performance Status: 1    Physical Exam    GA: alert, oriented, cooperative  HEENT: anicteric sclera, well injected conjunctiva  Heart: RRR, no murmurs or gallops heard  Lung: CTAB  Abd: +BS, soft, non tender abdomen  Ext: peripheral pulses positive  Right breast: 5 cm mass not extending to chest wall, mild erythema    Laboratory Data:  Lab Results   Component Value Date    WBC 4.9 06/13/2024    HGB 12.3 06/13/2024    HCT 37.5 06/13/2024    MCV 91 06/13/2024     06/13/2024       Chemistry    Lab Results   Component Value Date/Time     06/13/2024 1157    K 4.2 06/13/2024 1157     (H) 06/13/2024 1157    CO2 28 06/13/2024 1157    BUN 25 (H) 06/13/2024 1157    CREATININE 1.08 (H) 06/13/2024 1157    Lab Results   Component Value Date/Time    CALCIUM 9.1 06/13/2024 1157    ALKPHOS 88 06/13/2024 1157    AST 20 06/13/2024 1157    ALT 20 06/13/2024 1157    BILITOT 0.8 06/13/2024 1157             Radiology:  ECG 12 Lead  Atrial fibrillation  Low voltage QRS  Nonspecific ST and T wave abnormality  Abnormal ECG  When compared with ECG of 14-DEC-2023 10:45,  ST  no longer depressed in Inferior leads  T wave inversion no longer evident in Inferior leads  Confirmed by Pepito Greco ([id number]) on 4/30/2024 10:32:51 PM       BI US breast limited right 04/09/2024    Narrative  Interpreted By:  Bernardo Damon,  STUDY:  BI US BREAST LIMITED RIGHT;  4/9/2024 2:03 pm    ACCESSION NUMBER(S):  [id number]    ORDERING CLINICIAN:  DAVID MCCRAY    INDICATION:  Repeat sonographic evaluation of the right breast and axilla was  performed prior to biopsy. Initially, patient declined repeat  mammogram, however she agreed that additional mammographic views  would be useful during her biopsy procedure in these were done during  her post clip imaging.    COMPARISON:  03/26/2024, 03/22/2024    FINDINGS:  Targeted ultrasound was performed of the right breast by a registered  sonographer with elastography. In the right breast at 11 o'clock, 5  cm from the nipple, in the patient's area of palpable concern, there  is an irregular hypoechoic mass containing internal calcifications  measuring at least 5.1 x 2.5 x 4.2 cm. This is highly concerning for  malignancy. Scanning of the right axilla demonstrates at least 3  morphologically abnormal lymph nodes with cortical thickening.    Impression  Highly suspicious right breast mass with right axillary  lymphadenopathy. Biopsy will be subsequently performed. Additional  mammographic images were obtained at the time of post clip imaging.    BI-RADS CATEGORY:    BI-RADS CATEGORY:  5 Highly Suggestive of Malignancy.  Recommendation:  Surgical Consultation and Biopsy.  Recommended Date:  Immediate.  Laterality:  Right.    For any future breast imaging appointments, please call [phone]).      MACRO:  Bernardo Damon discussed the significance and urgency of this critical  finding in person with  DAVID MCCRAY on 4/9/2024 at 1 pm.  (**-RCF-**) Findings:  See findings.    Signed by: Bernardo Damon 4/9/2024 5:14 PM  Dictation workstation:   [id number]           Assessment/Plan:    74 year old female patient with hx of Afib on metoprolol and eliquis with newly diagnosed HER2 positive BC presenting to discuss treatment options    - 02/2024: breast pain followed by feeling a breast mass in her right breast    - 03/26/2024: follow up imaging on 03/26/2024 showed mass measuring 4cm at 12 oclock, 6 CFN    - 04/09/2024: US right breast showed a 5.1 cm mass at 11 oclock 5 CFN with at least 3 morphologically abnormal LN    - 04/09/2024: Right breast mass US guided core needle biopsy showed IDC G3 ER: 95%, LA: 20%, HER2 3+  with right axilla positive for metastatic carcinoma     I reviewed with her the events that led to her diagnosis of breast cancer. We reviewed all the procedures and diagnostic imaging she underwent thus far. I discussed the features of her breast cancer that include the size, grade, lymph node status and  hormone receptor/ her2-bobby status. Per NCCN guidelines, for HER2 positive breast ca more than 2cm and/or +LN, the current recommendation would be neoadjuvant treatment with dual HER2  blockade as well as chemotherapy. The goal of treatment would be to  downstage her cancer as well as to assess tumor chemosensitivity (whether or not a pathologic complete response is achieved or not). Carolin does not want to pursue chemotherapy and we agreed on doing dual HER2 blockade only with pertuzumab and trastuzumab, with plan for 6 cycles followed by surgery.     Staging scans showed no distant disease  05/06/2024: cycle 1 of phesgo and Anastrozole  05/28/2024: cycle 2 of phesgo  Mild LV dysfunction 45-50%, long standing Afib: 24 hr Holter recommended. Follows up with Dr Alonso on 05/30  Okay to proceed with cycle 3 of phesgo on Monday 06/17    At least 35 minutes of direct consultation was spent reviewing the patient's chart as well as discussing and  reviewing the  cancer care plan including educating and answering questions and concerns, greater than 50 percent  spent in counseling and coordination of care.          Cassy Whaley MD  Hematology and Medical Oncology  [facility]

## 2024-06-17 ENCOUNTER — APPOINTMENT (OUTPATIENT)
Dept: HEMATOLOGY/ONCOLOGY | Facility: CLINIC | Age: 74
End: 2024-06-17
Payer: MEDICARE

## 2024-06-17 ENCOUNTER — INFUSION (OUTPATIENT)
Dept: HEMATOLOGY/ONCOLOGY | Facility: CLINIC | Age: 74
End: 2024-06-17
Payer: MEDICARE

## 2024-06-17 VITALS
RESPIRATION RATE: 16 BRPM | TEMPERATURE: 97.5 F | BODY MASS INDEX: 31.03 KG/M2 | SYSTOLIC BLOOD PRESSURE: 145 MMHG | DIASTOLIC BLOOD PRESSURE: 86 MMHG | OXYGEN SATURATION: 95 % | HEART RATE: 86 BPM | WEIGHT: 210.1 LBS

## 2024-06-17 DIAGNOSIS — Z17.0 MALIGNANT NEOPLASM OF CENTRAL PORTION OF RIGHT BREAST IN FEMALE, ESTROGEN RECEPTOR POSITIVE (MULTI): ICD-10-CM

## 2024-06-17 DIAGNOSIS — C50.111 MALIGNANT NEOPLASM OF CENTRAL PORTION OF RIGHT BREAST IN FEMALE, ESTROGEN RECEPTOR POSITIVE (MULTI): ICD-10-CM

## 2024-06-17 PROCEDURE — 2500000004 HC RX 250 GENERAL PHARMACY W/ HCPCS (ALT 636 FOR OP/ED): Mod: JZ | Performed by: STUDENT IN AN ORGANIZED HEALTH CARE EDUCATION/TRAINING PROGRAM

## 2024-06-17 PROCEDURE — 96401 CHEMO ANTI-NEOPL SQ/IM: CPT

## 2024-06-17 RX ORDER — FAMOTIDINE 10 MG/ML
20 INJECTION INTRAVENOUS ONCE AS NEEDED
Status: DISCONTINUED | OUTPATIENT
Start: 2024-06-17 | End: 2024-06-17 | Stop reason: HOSPADM

## 2024-06-17 RX ORDER — ALBUTEROL SULFATE 0.83 MG/ML
3 SOLUTION RESPIRATORY (INHALATION) AS NEEDED
Status: DISCONTINUED | OUTPATIENT
Start: 2024-06-17 | End: 2024-06-17 | Stop reason: HOSPADM

## 2024-06-17 RX ORDER — EPINEPHRINE 0.3 MG/.3ML
0.3 INJECTION SUBCUTANEOUS EVERY 5 MIN PRN
Status: DISCONTINUED | OUTPATIENT
Start: 2024-06-17 | End: 2024-06-17 | Stop reason: HOSPADM

## 2024-06-17 RX ORDER — PROCHLORPERAZINE MALEATE 10 MG
10 TABLET ORAL EVERY 6 HOURS PRN
Status: DISCONTINUED | OUTPATIENT
Start: 2024-06-17 | End: 2024-06-17 | Stop reason: HOSPADM

## 2024-06-17 RX ORDER — PROCHLORPERAZINE EDISYLATE 5 MG/ML
10 INJECTION INTRAMUSCULAR; INTRAVENOUS EVERY 6 HOURS PRN
Status: DISCONTINUED | OUTPATIENT
Start: 2024-06-17 | End: 2024-06-17 | Stop reason: HOSPADM

## 2024-06-17 RX ORDER — DIPHENHYDRAMINE HYDROCHLORIDE 50 MG/ML
50 INJECTION INTRAMUSCULAR; INTRAVENOUS AS NEEDED
Status: DISCONTINUED | OUTPATIENT
Start: 2024-06-17 | End: 2024-06-17 | Stop reason: HOSPADM

## 2024-06-17 ASSESSMENT — PAIN SCALES - GENERAL: PAINLEVEL: 0-NO PAIN

## 2024-06-18 ENCOUNTER — APPOINTMENT (OUTPATIENT)
Dept: CARDIOLOGY | Facility: HOSPITAL | Age: 74
End: 2024-06-18
Payer: MEDICARE

## 2024-06-21 ENCOUNTER — TELEPHONE (OUTPATIENT)
Dept: ADMISSION | Facility: HOSPITAL | Age: 74
End: 2024-06-21
Payer: MEDICARE

## 2024-06-21 DIAGNOSIS — C50.111 MALIGNANT NEOPLASM OF CENTRAL PORTION OF RIGHT BREAST IN FEMALE, ESTROGEN RECEPTOR POSITIVE (MULTI): Primary | ICD-10-CM

## 2024-06-21 DIAGNOSIS — Z17.0 MALIGNANT NEOPLASM OF CENTRAL PORTION OF RIGHT BREAST IN FEMALE, ESTROGEN RECEPTOR POSITIVE (MULTI): Primary | ICD-10-CM

## 2024-06-21 NOTE — TELEPHONE ENCOUNTER
Pt is requesting lab orders be placed and sent to [facility] for upcoming infusions- 7/9, 7/29 and 8/20.   Pt will have labs completed a few days before infusion appts.   Last FUV 6/14 with next FUV 7/16

## 2024-06-24 ENCOUNTER — TELEPHONE (OUTPATIENT)
Dept: ADMISSION | Facility: HOSPITAL | Age: 74
End: 2024-06-24
Payer: MEDICARE

## 2024-06-24 NOTE — TELEPHONE ENCOUNTER
Patient called to confirm lab orders placed for next injection.    Then states she should have FUV on 7/9/24 and infusion scheduled.  But appears FUV on 7/9/24 was moved to 7/16/24.   Patient does not want to drive 2x to [place] and she is upset as she was not notified FUV was rescheduled to the following week.  Requesting FUV to be virtual instead.  But her next FUV is scheduled on 7/26 but that appointment is already virtual.

## 2024-06-25 ENCOUNTER — TELEPHONE (OUTPATIENT)
Dept: HEMATOLOGY/ONCOLOGY | Facility: CLINIC | Age: 74
End: 2024-06-25
Payer: MEDICARE

## 2024-06-25 NOTE — TELEPHONE ENCOUNTER
"Contacted patient in regard to concerns over scheduling, pt relates \"I want to keep the appointment\" offered patient to see Fang VILLAR as Dr. Whaley is out of office, pt agreeable understanding that follow up will occur during infusion, pt verbalizes understanding  "

## 2024-07-02 ENCOUNTER — LAB (OUTPATIENT)
Dept: LAB | Facility: LAB | Age: 74
End: 2024-07-02
Payer: MEDICARE

## 2024-07-02 DIAGNOSIS — C50.111 MALIGNANT NEOPLASM OF CENTRAL PORTION OF RIGHT BREAST IN FEMALE, ESTROGEN RECEPTOR POSITIVE (MULTI): ICD-10-CM

## 2024-07-02 DIAGNOSIS — Z17.0 MALIGNANT NEOPLASM OF CENTRAL PORTION OF RIGHT BREAST IN FEMALE, ESTROGEN RECEPTOR POSITIVE (MULTI): ICD-10-CM

## 2024-07-02 LAB
ALBUMIN SERPL BCP-MCNC: 4.2 G/DL (ref 3.4–5)
ALP SERPL-CCNC: 99 U/L (ref 33–136)
ALT SERPL W P-5'-P-CCNC: 23 U/L (ref 7–45)
ANION GAP SERPL CALC-SCNC: 8 MMOL/L (ref 10–20)
AST SERPL W P-5'-P-CCNC: 23 U/L (ref 9–39)
BASOPHILS # BLD AUTO: 0.03 X10*3/UL (ref 0–0.1)
BASOPHILS NFR BLD AUTO: 0.5 %
BILIRUB SERPL-MCNC: 0.6 MG/DL (ref 0–1.2)
BUN SERPL-MCNC: 25 MG/DL (ref 6–23)
CALCIUM SERPL-MCNC: 9.3 MG/DL (ref 8.6–10.3)
CHLORIDE SERPL-SCNC: 108 MMOL/L (ref 98–107)
CO2 SERPL-SCNC: 30 MMOL/L (ref 21–32)
CREAT SERPL-MCNC: 1.26 MG/DL (ref 0.5–1.05)
EGFRCR SERPLBLD CKD-EPI 2021: 45 ML/MIN/1.73M*2
EOSINOPHIL # BLD AUTO: 0.14 X10*3/UL (ref 0–0.4)
EOSINOPHIL NFR BLD AUTO: 2.3 %
ERYTHROCYTE [DISTWIDTH] IN BLOOD BY AUTOMATED COUNT: 12.7 % (ref 11.5–14.5)
GLUCOSE SERPL-MCNC: 88 MG/DL (ref 74–99)
HCT VFR BLD AUTO: 40.3 % (ref 36–46)
HGB BLD-MCNC: 12.7 G/DL (ref 12–16)
IMM GRANULOCYTES # BLD AUTO: 0.01 X10*3/UL (ref 0–0.5)
IMM GRANULOCYTES NFR BLD AUTO: 0.2 % (ref 0–0.9)
LYMPHOCYTES # BLD AUTO: 1.12 X10*3/UL (ref 0.8–3)
LYMPHOCYTES NFR BLD AUTO: 18 %
MCH RBC QN AUTO: 28.8 PG (ref 26–34)
MCHC RBC AUTO-ENTMCNC: 31.5 G/DL (ref 32–36)
MCV RBC AUTO: 91 FL (ref 80–100)
MONOCYTES # BLD AUTO: 0.46 X10*3/UL (ref 0.05–0.8)
MONOCYTES NFR BLD AUTO: 7.4 %
NEUTROPHILS # BLD AUTO: 4.46 X10*3/UL (ref 1.6–5.5)
NEUTROPHILS NFR BLD AUTO: 71.6 %
NRBC BLD-RTO: 0 /100 WBCS (ref 0–0)
PLATELET # BLD AUTO: 217 X10*3/UL (ref 150–450)
POTASSIUM SERPL-SCNC: 4.6 MMOL/L (ref 3.5–5.3)
PROT SERPL-MCNC: 6.3 G/DL (ref 6.4–8.2)
RBC # BLD AUTO: 4.41 X10*6/UL (ref 4–5.2)
SODIUM SERPL-SCNC: 141 MMOL/L (ref 136–145)
WBC # BLD AUTO: 6.2 X10*3/UL (ref 4.4–11.3)

## 2024-07-02 PROCEDURE — 36415 COLL VENOUS BLD VENIPUNCTURE: CPT

## 2024-07-02 PROCEDURE — 80053 COMPREHEN METABOLIC PANEL: CPT

## 2024-07-02 PROCEDURE — 85025 COMPLETE CBC W/AUTO DIFF WBC: CPT

## 2024-07-08 ENCOUNTER — APPOINTMENT (OUTPATIENT)
Dept: HEMATOLOGY/ONCOLOGY | Facility: CLINIC | Age: 74
End: 2024-07-08
Payer: MEDICARE

## 2024-07-08 NOTE — PROGRESS NOTES
Patient ID: Carolin Talamantes is a 74 y.o. female.    The patient presents to clinic today for her history of breast cancer.     Cancer Staging   Malignant neoplasm of central portion of right breast in female, estrogen receptor positive (Multi)  Staging form: Breast, AJCC 8th Edition  - Clinical: Stage IIB (cT3, cN1(f), cM0, G3, ER+, NE+, HER2+) - Signed by Morena Garrido DO on 4/23/2024        Diagnostic/Therapeutic History:    - 02/2024: breast pain followed by feeling a breast mass in her right breast    - 03/26/2024: follow up imaging on 03/26/2024 showed mass measuring 4cm at 12 oclock, 6 CFN    - 04/09/2024: US right breast showed a 5.1 cm mass at 11 oclock 5 CFN with at least 3 morphologically abnormal LN    - 04/09/2024: Right breast mass US guided core needle biopsy showed IDC G3 ER: 95%, NE: 20%, HER2 3+  with right axilla positive for metastatic carcinoma    -04/23/2024: 6 mm RLL nodule for which follow up is needed. No distant disease. Bone scan was negative.     - 05/06/2024: cycle 1 of phesgo and Anastrozole    - 05/28/2024: cycle 2 of phesgo    History of Present Illness (HPI)/Interval History:  Overall feels well. C4 HP today + compliant on daily anastrozole.     She always has post-nasal drip, inside of nose feels raw.     Does have intermittent cramping in the right leg and swelling in the ankle of the right ankle. Still has tenderness to palpation of right calf.     Feels mass is smaller.     She denies any fevers or chills.    She denies any chest pain or breathing issues.    She reports a normal appetite and staying well hydrated. Has diarrhea on-and-off for all 3 weeks. Better with rice and bananas. Susan helps with dizziness and upset stomach.     Energy is a bit down in the last few weeks but today feeling better.     Has been having increase candida infections under breast and in her groin, cleared up     + hair loss and scalp sensitivity.     PMH: Afib on eliquis and metoprolol    PSH: No  surgery    Allergies: no allergies, GI intolerence to erythromycin    Social hx: no smoking, no alcohol    Family hx: no hx of cancer    Menarche at 15, menopause: 57, OCP x1 years,  No HRT      Review of Systems:  14-point ROS otherwise negative, as per HPI.    No past medical history on file.    No past surgical history on file.    Social History     Socioeconomic History    Marital status:      Spouse name: Not on file    Number of children: Not on file    Years of education: Not on file    Highest education level: Not on file   Occupational History    Not on file   Tobacco Use    Smoking status: Never    Smokeless tobacco: Never   Substance and Sexual Activity    Alcohol use: Not Currently    Drug use: Never    Sexual activity: Not on file   Other Topics Concern    Not on file   Social History Narrative    Not on file     Social Determinants of Health     Financial Resource Strain: Not on file   Food Insecurity: Not on file   Transportation Needs: Not on file   Physical Activity: Not on file   Stress: Not on file   Social Connections: Not on file   Intimate Partner Violence: Not on file   Housing Stability: Not on file       Allergies   Allergen Reactions    Erythromycin GI Upset     Other reaction(s): GI Upset         Current Outpatient Medications:     anastrozole (Arimidex) 1 mg tablet, Take 1 tablet (1 mg total) by mouth once daily.  Swallow whole with a drink of water., Disp: 90 tablet, Rfl: 3    apixaban (Eliquis) 5 mg tablet, Take 1 tablet (5 mg) by mouth twice a day., Disp: , Rfl:     ashwagandha root extract 300 mg tablet, Take 1 tablet by mouth once daily., Disp: , Rfl:     calcium carbonate-vitamin D3 (Oyster Shell Calcium-Vit D3) 500 mg-5 mcg (200 unit) tablet, Take 1 tablet by mouth once daily., Disp: , Rfl:     cholecalciferol (Vitamin D-3) 25 MCG (1000 UT) capsule, Take 1 capsule (25 mcg) by mouth once daily., Disp: , Rfl:     co-enzyme Q-10 50 mg capsule, Take 2 capsules (100 mg) by mouth  once daily., Disp: , Rfl:     garlic 500 mg capsule, Take 1 tablet by mouth once daily., Disp: , Rfl:     ginkgo biloba leaf extract 60 mg tablet, Take 1 tablet by mouth once daily., Disp: , Rfl:     glucosamine-chondroitin 500-400 mg tablet, Take 2 tablets by mouth once daily., Disp: , Rfl:     losartan (Cozaar) 25 mg tablet, Take 1 tablet (25 mg) by mouth once daily., Disp: 90 tablet, Rfl: 3    metoprolol succinate XL (Toprol-XL) 25 mg 24 hr tablet, Take 1 tablet (25 mg) by mouth once daily., Disp: 90 tablet, Rfl: 3    multivitamin tablet, Take 1 tablet by mouth once daily., Disp: , Rfl:     turmeric root extract 500 mg tablet, Take 1 tablet by mouth once daily., Disp: , Rfl:     UNABLE TO FIND, Take 1 tablet by mouth once daily. Magnesium 250mg, Disp: , Rfl:     UNABLE TO FIND, Take 1 tablet by mouth once daily. Fish Oil 1000mg, Disp: , Rfl:     UNABLE TO FIND, Take 1 tablet by mouth once daily. Susan Root, Disp: , Rfl:     vitamin E 180 mg (400 unit) capsule, Take 1 capsule (400 Units) by mouth once daily., Disp: , Rfl:      Objective    BSA: There is no height or weight on file to calculate BSA.  There were no vitals taken for this visit.    ECOG Performance Status: 1    Physical Exam  Vitals reviewed.   Constitutional:       General: She is not in acute distress.     Appearance: She is not toxic-appearing.   HENT:      Mouth/Throat:      Mouth: Mucous membranes are moist.      Pharynx: Oropharynx is clear.   Eyes:      General: No scleral icterus.     Extraocular Movements: Extraocular movements intact.      Conjunctiva/sclera: Conjunctivae normal.   Cardiovascular:      Rate and Rhythm: Normal rate and regular rhythm.      Heart sounds: Normal heart sounds.   Pulmonary:      Effort: Pulmonary effort is normal. No respiratory distress.      Breath sounds: Normal breath sounds.   Musculoskeletal:         General: No swelling.      Right lower leg: Tenderness (calf tenderness) present.      Right ankle:  Swelling present.   Skin:     General: Skin is warm and dry.      Coloration: Skin is not jaundiced.   Neurological:      General: No focal deficit present.      Mental Status: She is alert and oriented to person, place, and time.   Psychiatric:         Mood and Affect: Mood normal.         Behavior: Behavior normal.         Thought Content: Thought content normal.         Judgment: Judgment normal.         Laboratory Data:  Lab Results   Component Value Date    WBC 6.2 07/02/2024    HGB 12.7 07/02/2024    HCT 40.3 07/02/2024    MCV 91 07/02/2024     07/02/2024       Chemistry    Lab Results   Component Value Date/Time     07/02/2024 1248    K 4.6 07/02/2024 1248     (H) 07/02/2024 1248    CO2 30 07/02/2024 1248    BUN 25 (H) 07/02/2024 1248    CREATININE 1.26 (H) 07/02/2024 1248    Lab Results   Component Value Date/Time    CALCIUM 9.3 07/02/2024 1248    ALKPHOS 99 07/02/2024 1248    AST 23 07/02/2024 1248    ALT 23 07/02/2024 1248    BILITOT 0.6 07/02/2024 1248             Radiology:  ECG 12 Lead  Atrial fibrillation  Low voltage QRS  Nonspecific ST and T wave abnormality  Abnormal ECG  When compared with ECG of 14-DEC-2023 10:45,  ST no longer depressed in Inferior leads  T wave inversion no longer evident in Inferior leads  Confirmed by Pepito Greco ([id number]) on 4/30/2024 10:32:51 PM       BI US breast limited right 04/09/2024    Narrative  Interpreted By:  Bernardo Damon,  STUDY:  BI US BREAST LIMITED RIGHT;  4/9/2024 2:03 pm    ACCESSION NUMBER(S):  [id number]    ORDERING CLINICIAN:  DAVID MCCRAY    INDICATION:  Repeat sonographic evaluation of the right breast and axilla was  performed prior to biopsy. Initially, patient declined repeat  mammogram, however she agreed that additional mammographic views  would be useful during her biopsy procedure in these were done during  her post clip imaging.    COMPARISON:  03/26/2024, 03/22/2024    FINDINGS:  Targeted ultrasound was performed of the  right breast by a registered  sonographer with elastography. In the right breast at 11 o'clock, 5  cm from the nipple, in the patient's area of palpable concern, there  is an irregular hypoechoic mass containing internal calcifications  measuring at least 5.1 x 2.5 x 4.2 cm. This is highly concerning for  malignancy. Scanning of the right axilla demonstrates at least 3  morphologically abnormal lymph nodes with cortical thickening.    Impression  Highly suspicious right breast mass with right axillary  lymphadenopathy. Biopsy will be subsequently performed. Additional  mammographic images were obtained at the time of post clip imaging.    BI-RADS CATEGORY:    BI-RADS CATEGORY:  5 Highly Suggestive of Malignancy.  Recommendation:  Surgical Consultation and Biopsy.  Recommended Date:  Immediate.  Laterality:  Right.    For any future breast imaging appointments, please call [phone]  ([phone]).      MACRO:  Bernardo Damon discussed the significance and urgency of this critical  finding in person with  DAVID MCCRAY on 4/9/2024 at 1 pm.  (**-RCF-**) Findings:  See findings.    Signed by: Bernardo Damon 4/9/2024 5:14 PM  Dictation workstation:   [id number]          Assessment/Plan:    74 year old female patient with hx of Afib on metoprolol and eliquis with newly diagnosed HER2 positive BC presenting to discuss treatment options    - 02/2024: breast pain followed by feeling a breast mass in her right breast    - 03/26/2024: follow up imaging on 03/26/2024 showed mass measuring 4cm at 12 oclock, 6 CFN    - 04/09/2024: US right breast showed a 5.1 cm mass at 11 oclock 5 CFN with at least 3 morphologically abnormal LN    - 04/09/2024: Right breast mass US guided core needle biopsy showed IDC G3 ER: 95%, ND: 20%, HER2 3+  with right axilla positive for metastatic carcinoma     I reviewed with her the events that led to her diagnosis of breast cancer. We reviewed all the procedures and diagnostic imaging she underwent thus far.  I discussed the features of her breast cancer that include the size, grade, lymph node status and  hormone receptor/ her2-bobby status. Per NCCN guidelines, for HER2 positive breast ca more than 2cm and/or +LN, the current recommendation would be neoadjuvant treatment with dual HER2  blockade as well as chemotherapy. The goal of treatment would be to  downstage her cancer as well as to assess tumor chemosensitivity (whether or not a pathologic complete response is achieved or not). Carolin does not want to pursue chemotherapy and we agreed on doing dual HER2 blockade only with pertuzumab and trastuzumab, with plan for 6 cycles followed by surgery.     Staging scans showed no distant disease  05/06/2024: cycle 1 of phesgo and Anastrozole  05/28/2024: cycle 2 of phesgo  Mild LV dysfunction 45-50%, long standing Afib: 24 hr Holter recommended. Follows up with Dr Alonso on 05/30 - Okayed to continue with treatment with next ECHO 7/18 and follow up with Dr. Johnson  - Okay to proceed with cycle 4 of phesgo  - No evidence of worsening heart failure on today's exam: Will order right LE DVT scan given pain and swelling.   - Will check troponin and BNP today as well   - Recheck CBC and CMP for possible sources of leg cramp   - Grade 1-2 diarrhea: would like to continue rice and banana diet prn. Not interested in trying imodium at this time   - Recurrent candida: self-resolving with OTC treatment, offered nystatin but declined   - Grade 1 fatigue/arthralgias: would like to continue with anastrozole for now    There is no evidence of breast cancer recurrence based on her clinical exam today.          Fang Phan PA-C  Hematology and Medical Oncology  [facility]

## 2024-07-09 ENCOUNTER — INFUSION (OUTPATIENT)
Dept: HEMATOLOGY/ONCOLOGY | Facility: CLINIC | Age: 74
End: 2024-07-09
Payer: MEDICARE

## 2024-07-09 ENCOUNTER — OFFICE VISIT (OUTPATIENT)
Dept: HEMATOLOGY/ONCOLOGY | Facility: CLINIC | Age: 74
End: 2024-07-09
Payer: MEDICARE

## 2024-07-09 ENCOUNTER — APPOINTMENT (OUTPATIENT)
Dept: HEMATOLOGY/ONCOLOGY | Facility: CLINIC | Age: 74
End: 2024-07-09
Payer: MEDICARE

## 2024-07-09 VITALS
DIASTOLIC BLOOD PRESSURE: 96 MMHG | SYSTOLIC BLOOD PRESSURE: 144 MMHG | TEMPERATURE: 97.2 F | HEART RATE: 83 BPM | RESPIRATION RATE: 16 BRPM | WEIGHT: 206.79 LBS | BODY MASS INDEX: 30.54 KG/M2 | OXYGEN SATURATION: 95 %

## 2024-07-09 DIAGNOSIS — Z17.0 MALIGNANT NEOPLASM OF CENTRAL PORTION OF RIGHT BREAST IN FEMALE, ESTROGEN RECEPTOR POSITIVE (MULTI): Primary | ICD-10-CM

## 2024-07-09 DIAGNOSIS — Z17.0 MALIGNANT NEOPLASM OF CENTRAL PORTION OF RIGHT BREAST IN FEMALE, ESTROGEN RECEPTOR POSITIVE (MULTI): ICD-10-CM

## 2024-07-09 DIAGNOSIS — C50.111 MALIGNANT NEOPLASM OF CENTRAL PORTION OF RIGHT BREAST IN FEMALE, ESTROGEN RECEPTOR POSITIVE (MULTI): Primary | ICD-10-CM

## 2024-07-09 DIAGNOSIS — M79.89 SWELLING OF RIGHT LOWER EXTREMITY: ICD-10-CM

## 2024-07-09 DIAGNOSIS — C50.111 MALIGNANT NEOPLASM OF CENTRAL PORTION OF RIGHT BREAST IN FEMALE, ESTROGEN RECEPTOR POSITIVE (MULTI): ICD-10-CM

## 2024-07-09 LAB
ALBUMIN SERPL BCP-MCNC: 4.3 G/DL (ref 3.4–5)
ALP SERPL-CCNC: 98 U/L (ref 33–136)
ALT SERPL W P-5'-P-CCNC: 21 U/L (ref 7–45)
ANION GAP SERPL CALC-SCNC: 11 MMOL/L (ref 10–20)
AST SERPL W P-5'-P-CCNC: 24 U/L (ref 9–39)
BASOPHILS # BLD AUTO: 0.03 X10*3/UL (ref 0–0.1)
BASOPHILS NFR BLD AUTO: 0.5 %
BILIRUB SERPL-MCNC: 0.8 MG/DL (ref 0–1.2)
BNP SERPL-MCNC: 38 PG/ML (ref 0–99)
BUN SERPL-MCNC: 26 MG/DL (ref 6–23)
CALCIUM SERPL-MCNC: 9.9 MG/DL (ref 8.6–10.3)
CARDIAC TROPONIN I PNL SERPL HS: 5 NG/L (ref 0–34)
CHLORIDE SERPL-SCNC: 105 MMOL/L (ref 98–107)
CO2 SERPL-SCNC: 27 MMOL/L (ref 21–32)
CREAT SERPL-MCNC: 1.11 MG/DL (ref 0.5–1.05)
EGFRCR SERPLBLD CKD-EPI 2021: 53 ML/MIN/1.73M*2
EOSINOPHIL # BLD AUTO: 0.16 X10*3/UL (ref 0–0.4)
EOSINOPHIL NFR BLD AUTO: 2.9 %
ERYTHROCYTE [DISTWIDTH] IN BLOOD BY AUTOMATED COUNT: 12.8 % (ref 11.5–14.5)
GLUCOSE SERPL-MCNC: 107 MG/DL (ref 74–99)
HCT VFR BLD AUTO: 40.6 % (ref 36–46)
HGB BLD-MCNC: 12.9 G/DL (ref 12–16)
IMM GRANULOCYTES # BLD AUTO: 0.01 X10*3/UL (ref 0–0.5)
IMM GRANULOCYTES NFR BLD AUTO: 0.2 % (ref 0–0.9)
LYMPHOCYTES # BLD AUTO: 1.41 X10*3/UL (ref 0.8–3)
LYMPHOCYTES NFR BLD AUTO: 25.2 %
MAGNESIUM SERPL-MCNC: 2.25 MG/DL (ref 1.6–2.4)
MCH RBC QN AUTO: 29.2 PG (ref 26–34)
MCHC RBC AUTO-ENTMCNC: 31.8 G/DL (ref 32–36)
MCV RBC AUTO: 92 FL (ref 80–100)
MONOCYTES # BLD AUTO: 0.48 X10*3/UL (ref 0.05–0.8)
MONOCYTES NFR BLD AUTO: 8.6 %
NEUTROPHILS # BLD AUTO: 3.5 X10*3/UL (ref 1.6–5.5)
NEUTROPHILS NFR BLD AUTO: 62.6 %
NRBC BLD-RTO: ABNORMAL /100{WBCS}
PLATELET # BLD AUTO: 193 X10*3/UL (ref 150–450)
POTASSIUM SERPL-SCNC: 4.1 MMOL/L (ref 3.5–5.3)
PROT SERPL-MCNC: 7.1 G/DL (ref 6.4–8.2)
RBC # BLD AUTO: 4.42 X10*6/UL (ref 4–5.2)
SODIUM SERPL-SCNC: 139 MMOL/L (ref 136–145)
WBC # BLD AUTO: 5.6 X10*3/UL (ref 4.4–11.3)

## 2024-07-09 PROCEDURE — 84075 ASSAY ALKALINE PHOSPHATASE: CPT

## 2024-07-09 PROCEDURE — 99215 OFFICE O/P EST HI 40 MIN: CPT

## 2024-07-09 PROCEDURE — 84484 ASSAY OF TROPONIN QUANT: CPT

## 2024-07-09 PROCEDURE — 83880 ASSAY OF NATRIURETIC PEPTIDE: CPT

## 2024-07-09 PROCEDURE — 83735 ASSAY OF MAGNESIUM: CPT

## 2024-07-09 PROCEDURE — 1036F TOBACCO NON-USER: CPT

## 2024-07-09 PROCEDURE — 85025 COMPLETE CBC W/AUTO DIFF WBC: CPT

## 2024-07-09 PROCEDURE — 96401 CHEMO ANTI-NEOPL SQ/IM: CPT

## 2024-07-09 PROCEDURE — 2500000004 HC RX 250 GENERAL PHARMACY W/ HCPCS (ALT 636 FOR OP/ED): Mod: JZ,JG | Performed by: STUDENT IN AN ORGANIZED HEALTH CARE EDUCATION/TRAINING PROGRAM

## 2024-07-09 RX ORDER — EPINEPHRINE 0.3 MG/.3ML
0.3 INJECTION SUBCUTANEOUS EVERY 5 MIN PRN
Status: DISCONTINUED | OUTPATIENT
Start: 2024-07-09 | End: 2024-07-09 | Stop reason: HOSPADM

## 2024-07-09 RX ORDER — PROCHLORPERAZINE EDISYLATE 5 MG/ML
10 INJECTION INTRAMUSCULAR; INTRAVENOUS EVERY 6 HOURS PRN
Status: DISCONTINUED | OUTPATIENT
Start: 2024-07-09 | End: 2024-07-09 | Stop reason: HOSPADM

## 2024-07-09 RX ORDER — DIPHENHYDRAMINE HYDROCHLORIDE 50 MG/ML
50 INJECTION INTRAMUSCULAR; INTRAVENOUS AS NEEDED
Status: DISCONTINUED | OUTPATIENT
Start: 2024-07-09 | End: 2024-07-09 | Stop reason: HOSPADM

## 2024-07-09 RX ORDER — ALBUTEROL SULFATE 0.83 MG/ML
3 SOLUTION RESPIRATORY (INHALATION) AS NEEDED
Status: DISCONTINUED | OUTPATIENT
Start: 2024-07-09 | End: 2024-07-09 | Stop reason: HOSPADM

## 2024-07-09 RX ORDER — PROCHLORPERAZINE MALEATE 10 MG
10 TABLET ORAL EVERY 6 HOURS PRN
Status: DISCONTINUED | OUTPATIENT
Start: 2024-07-09 | End: 2024-07-09 | Stop reason: HOSPADM

## 2024-07-09 RX ORDER — FAMOTIDINE 10 MG/ML
20 INJECTION INTRAVENOUS ONCE AS NEEDED
Status: DISCONTINUED | OUTPATIENT
Start: 2024-07-09 | End: 2024-07-09 | Stop reason: HOSPADM

## 2024-07-09 ASSESSMENT — PAIN SCALES - GENERAL: PAINLEVEL: 0-NO PAIN

## 2024-07-09 NOTE — SIGNIFICANT EVENT

## 2024-07-10 ENCOUNTER — APPOINTMENT (OUTPATIENT)
Dept: VASCULAR MEDICINE | Facility: HOSPITAL | Age: 74
End: 2024-07-10
Payer: MEDICARE

## 2024-07-11 ENCOUNTER — APPOINTMENT (OUTPATIENT)
Dept: CARDIOLOGY | Facility: CLINIC | Age: 74
End: 2024-07-11
Payer: MEDICARE

## 2024-07-11 ENCOUNTER — HOSPITAL ENCOUNTER (OUTPATIENT)
Dept: VASCULAR MEDICINE | Facility: HOSPITAL | Age: 74
Discharge: HOME | End: 2024-07-11
Payer: MEDICARE

## 2024-07-11 ENCOUNTER — APPOINTMENT (OUTPATIENT)
Dept: CARDIOLOGY | Facility: HOSPITAL | Age: 74
End: 2024-07-11
Payer: MEDICARE

## 2024-07-11 DIAGNOSIS — M79.661 PAIN IN RIGHT LOWER LEG: ICD-10-CM

## 2024-07-11 DIAGNOSIS — M79.89 SWELLING OF RIGHT LOWER EXTREMITY: ICD-10-CM

## 2024-07-11 PROCEDURE — 93971 EXTREMITY STUDY: CPT

## 2024-07-11 PROCEDURE — 93971 EXTREMITY STUDY: CPT | Performed by: STUDENT IN AN ORGANIZED HEALTH CARE EDUCATION/TRAINING PROGRAM

## 2024-07-15 DIAGNOSIS — Z17.0 MALIGNANT NEOPLASM OF CENTRAL PORTION OF RIGHT BREAST IN FEMALE, ESTROGEN RECEPTOR POSITIVE (MULTI): ICD-10-CM

## 2024-07-15 DIAGNOSIS — C50.111 MALIGNANT NEOPLASM OF CENTRAL PORTION OF RIGHT BREAST IN FEMALE, ESTROGEN RECEPTOR POSITIVE (MULTI): ICD-10-CM

## 2024-07-15 RX ORDER — ANASTROZOLE 1 MG/1
1 TABLET ORAL DAILY
Qty: 90 TABLET | Refills: 3 | Status: SHIPPED | OUTPATIENT
Start: 2024-07-15 | End: 2025-07-15

## 2024-07-15 NOTE — TELEPHONE ENCOUNTER
Refill request received for Anastrozole 1mg.  Preferred pharmacy is [facility].  Medication pended to team to refill if appropriate,

## 2024-07-16 ENCOUNTER — APPOINTMENT (OUTPATIENT)
Dept: HEMATOLOGY/ONCOLOGY | Facility: CLINIC | Age: 74
End: 2024-07-16
Payer: MEDICARE

## 2024-07-16 ENCOUNTER — APPOINTMENT (OUTPATIENT)
Dept: CARDIOLOGY | Facility: HOSPITAL | Age: 74
End: 2024-07-16
Payer: MEDICARE

## 2024-07-18 ENCOUNTER — HOSPITAL ENCOUNTER (OUTPATIENT)
Dept: CARDIOLOGY | Facility: HOSPITAL | Age: 74
Discharge: HOME | End: 2024-07-18
Payer: MEDICARE

## 2024-07-18 DIAGNOSIS — Z17.0 MALIGNANT NEOPLASM OF CENTRAL PORTION OF RIGHT BREAST IN FEMALE, ESTROGEN RECEPTOR POSITIVE (MULTI): ICD-10-CM

## 2024-07-18 DIAGNOSIS — I48.11 LONGSTANDING PERSISTENT ATRIAL FIBRILLATION (MULTI): ICD-10-CM

## 2024-07-18 DIAGNOSIS — C50.111 MALIGNANT NEOPLASM OF CENTRAL PORTION OF RIGHT BREAST IN FEMALE, ESTROGEN RECEPTOR POSITIVE (MULTI): ICD-10-CM

## 2024-07-18 DIAGNOSIS — Z79.899 ENCOUNTER FOR MONITORING CARDIOTOXIC DRUG THERAPY: ICD-10-CM

## 2024-07-18 DIAGNOSIS — Z51.81 ENCOUNTER FOR MONITORING CARDIOTOXIC DRUG THERAPY: ICD-10-CM

## 2024-07-18 LAB
AORTIC VALVE MEAN GRADIENT: 3 MMHG
AORTIC VALVE PEAK VELOCITY: 1.26 M/S
AV PEAK GRADIENT: 6.4 MMHG
AVA (PEAK VEL): 3.2 CM2
AVA (VTI): 2.91 CM2
EJECTION FRACTION APICAL 4 CHAMBER: 62.4
EJECTION FRACTION: 48 %
LEFT VENTRICLE INTERNAL DIMENSION DIASTOLE: 5.44 CM (ref 3.5–6)
LEFT VENTRICULAR OUTFLOW TRACT DIAMETER: 2.2 CM
LV EJECTION FRACTION BIPLANE: 54 %
RIGHT VENTRICLE PEAK SYSTOLIC PRESSURE: 29.4 MMHG

## 2024-07-18 PROCEDURE — 93308 TTE F-UP OR LMTD: CPT | Performed by: STUDENT IN AN ORGANIZED HEALTH CARE EDUCATION/TRAINING PROGRAM

## 2024-07-18 PROCEDURE — 93308 TTE F-UP OR LMTD: CPT

## 2024-07-24 NOTE — PROGRESS NOTES
" Subjective   Carolin Talamantes is a 74 y.o. female with malignant neoplasm of the right breast, stage IIb.  Follows with Dr. Whaley-Currently on treatment with pertuzumab/trastuzumab plan for 6 cycles followed by surgery.  Echocardiogram dated July 20, 2023 notes severe left atrial dilatation, mildly decreased left ventricular ejection fraction 45 to 50%.  Patient also has a history of atrial fibrillation.  Subsequent echocardiogram July 18, 2024 showed no changes compared to the prior echo.    On this visit patient denies any chest discomfort or shortness of breath.  Denies any orthopnea/PND/lower extremity edema.  Does not have any bleeding complications from the Eliquis.  Notably has had atrial fibrillation since 2009 (was paroxysmal initially; now appears to be longstanding persistent)    Review of Systems  A comprehensive review of systems was negative.     Past medical history: Atrial fibrillation, low ejection fraction, breast cancer  History reviewed. No pertinent surgical history.    Allergies   Allergen Reactions    Erythromycin GI Upset     Other reaction(s): GI Upset     No family history on file.    Objective   Visit Vitals  /82   Pulse 94   Ht 1.753 m (5' 9\")   Wt 94.8 kg (209 lb)   SpO2 97%   BMI 30.86 kg/m²   OB Status Postmenopausal   Smoking Status Never   BSA 2.15 m²     General: awake, alert and oriented. No acute distress.   Skin: Skin is warm, dry and intact without rashes or lesions. Appropriate color for ethnicity. Nail beds pink with no cyanosis or clubbing  HEENT: normocephalic, atraumatic; conjunctivae are clear without exudates or hemorrhage. Sclera is non-icteric. Eyelids are normal in appearance without swelling or lesions. Hearing intact. Nares are patent bilaterally. Moist mucous membranes.   Cardiovascular: Regular. No murmurs, gallops, or rubs are auscultated. S1 and S2 are heard and are of normal intensity. No JVD, no carotid bruits  Respiratory: Thorax symmetric. CTAB, breath " sounds vesicular. No crackles, wheezes or ronchi.   Gastrointestinal: soft, non-distended, BS + x 4  Genitourinary: exam deferred  Musculoskeletal: moves all extremities  Extremities: pulses palpable bilaterally; no swelling or erythema; no edema  Neurological: alert & oriented x 3; no focal deficits  Psychiatric: appropriate mood and affect    Current Outpatient Medications   Medication Instructions    anastrozole (ARIMIDEX) 1 mg, oral, Daily, Swallow whole with a drink of water.    apixaban (ELIQUIS) 5 mg, oral, 2 times daily    ashwagandha root extract 300 mg tablet 1 tablet, oral, Daily    calcium carbonate-vitamin D3 (Oyster Shell Calcium-Vit D3) 500 mg-5 mcg (200 unit) tablet 1 tablet, oral, Daily    cholecalciferol (VITAMIN D-3) 25 mcg, oral, Daily    co-enzyme Q-10 100 mg, oral, Daily    garlic 500 mg capsule 1 tablet, oral, Daily    ginkgo biloba leaf extract 60 mg tablet 1 tablet, oral, Daily    glucosamine-chondroitin 500-400 mg tablet 2 tablets, oral, Daily    losartan (COZAAR) 25 mg, oral, Daily    metoprolol succinate XL (TOPROL-XL) 25 mg, oral, Daily RT    multivitamin tablet 1 tablet, oral, Daily    turmeric root extract 500 mg tablet 1 tablet, oral, Daily    UNABLE TO FIND 1 tablet, oral, Daily, Magnesium 250mg    UNABLE TO FIND 1 tablet, oral, Daily, Fish Oil 1000mg    UNABLE TO FIND 1 tablet, oral, Daily, Susan Root    vitamin E 400 Units, oral, Daily       Lab Review   Lab Results   Component Value Date    HGB 12.9 07/09/2024    HGB 12.7 07/02/2024    HGB 12.3 06/13/2024     07/09/2024    WBC 5.6 07/09/2024     07/09/2024    K 4.1 07/09/2024    CREATININE 1.11 (H) 07/09/2024    CREATININE 1.26 (H) 07/02/2024    CREATININE 1.08 (H) 06/13/2024    BUN 26 (H) 07/09/2024    CALCIUM 9.9 07/09/2024    INR 1.1 04/23/2024    BNP 38 07/09/2024    TROPHS 5 07/09/2024    TROPHS 3 05/06/2024        Assessment/Plan   Cardiac issues include:    Plan treatment with cardiotoxic chemotherapy  -Is on  "receive trastuzumab based chemotherapy.  Given low ejection fraction/atrial fibrillation at baseline I asked her to keep an eye out for \"red flag\" symptoms and call us in case she develops any chest discomfort/shortness of breath/lower extremity edema  -Serial echocardiograms have shown no evidence of cardiotoxicity.  Patient has 2 more treatments remaining; will get her neck screening echocardiogram in 3 months (October 2024) followed by an in person visit.      Heart failure with midrange ejection fraction  -Currently on minimal GDMT with Toprol, losartan 25 mg daily  -\"Warm and dry\" on exam.  EF likely appears artifactually low due to existing atrial fibrillation.  Will continue to monitor on current therapy    Atrial fibrillation  -Rate control seems an appropriate strategy in the septuagenarian with minimal symptoms  -Continue Eliquis for now    RTC 3 months with echo as mentioned above     Bob Johnson MD  Advanced Heart Failure/Transplant Cardiology  Cardio-Oncology  [facility]   "

## 2024-07-25 ENCOUNTER — APPOINTMENT (OUTPATIENT)
Dept: CARDIOLOGY | Facility: CLINIC | Age: 74
End: 2024-07-25
Payer: MEDICARE

## 2024-07-25 VITALS
SYSTOLIC BLOOD PRESSURE: 138 MMHG | OXYGEN SATURATION: 97 % | DIASTOLIC BLOOD PRESSURE: 82 MMHG | HEART RATE: 94 BPM | HEIGHT: 69 IN | WEIGHT: 209 LBS | BODY MASS INDEX: 30.96 KG/M2

## 2024-07-25 DIAGNOSIS — Z17.0 MALIGNANT NEOPLASM OF CENTRAL PORTION OF RIGHT BREAST IN FEMALE, ESTROGEN RECEPTOR POSITIVE (MULTI): ICD-10-CM

## 2024-07-25 DIAGNOSIS — C50.111 MALIGNANT NEOPLASM OF CENTRAL PORTION OF RIGHT BREAST IN FEMALE, ESTROGEN RECEPTOR POSITIVE (MULTI): ICD-10-CM

## 2024-07-25 DIAGNOSIS — I51.89 MILD LEFT VENTRICULAR SYSTOLIC DYSFUNCTION (LVSD): ICD-10-CM

## 2024-07-25 DIAGNOSIS — I48.0 PAROXYSMAL ATRIAL FIBRILLATION (MULTI): Primary | ICD-10-CM

## 2024-07-25 PROCEDURE — 3008F BODY MASS INDEX DOCD: CPT | Performed by: STUDENT IN AN ORGANIZED HEALTH CARE EDUCATION/TRAINING PROGRAM

## 2024-07-25 PROCEDURE — 99214 OFFICE O/P EST MOD 30 MIN: CPT | Performed by: STUDENT IN AN ORGANIZED HEALTH CARE EDUCATION/TRAINING PROGRAM

## 2024-07-25 PROCEDURE — G2211 COMPLEX E/M VISIT ADD ON: HCPCS | Performed by: STUDENT IN AN ORGANIZED HEALTH CARE EDUCATION/TRAINING PROGRAM

## 2024-07-25 PROCEDURE — 1036F TOBACCO NON-USER: CPT | Performed by: STUDENT IN AN ORGANIZED HEALTH CARE EDUCATION/TRAINING PROGRAM

## 2024-07-25 PROCEDURE — 1159F MED LIST DOCD IN RCRD: CPT | Performed by: STUDENT IN AN ORGANIZED HEALTH CARE EDUCATION/TRAINING PROGRAM

## 2024-07-26 ENCOUNTER — TELEMEDICINE (OUTPATIENT)
Dept: HEMATOLOGY/ONCOLOGY | Facility: HOSPITAL | Age: 74
End: 2024-07-26
Payer: MEDICARE

## 2024-07-26 ENCOUNTER — LAB (OUTPATIENT)
Dept: LAB | Facility: LAB | Age: 74
End: 2024-07-26
Payer: MEDICARE

## 2024-07-26 DIAGNOSIS — Z17.0 MALIGNANT NEOPLASM OF CENTRAL PORTION OF RIGHT BREAST IN FEMALE, ESTROGEN RECEPTOR POSITIVE (MULTI): Primary | ICD-10-CM

## 2024-07-26 DIAGNOSIS — C50.111 MALIGNANT NEOPLASM OF CENTRAL PORTION OF RIGHT BREAST IN FEMALE, ESTROGEN RECEPTOR POSITIVE (MULTI): ICD-10-CM

## 2024-07-26 DIAGNOSIS — Z17.0 MALIGNANT NEOPLASM OF CENTRAL PORTION OF RIGHT BREAST IN FEMALE, ESTROGEN RECEPTOR POSITIVE (MULTI): ICD-10-CM

## 2024-07-26 DIAGNOSIS — C50.111 MALIGNANT NEOPLASM OF CENTRAL PORTION OF RIGHT BREAST IN FEMALE, ESTROGEN RECEPTOR POSITIVE (MULTI): Primary | ICD-10-CM

## 2024-07-26 LAB
ALBUMIN SERPL BCP-MCNC: 4.2 G/DL (ref 3.4–5)
ALP SERPL-CCNC: 99 U/L (ref 33–136)
ALT SERPL W P-5'-P-CCNC: 22 U/L (ref 7–45)
ANION GAP SERPL CALC-SCNC: 10 MMOL/L (ref 10–20)
AST SERPL W P-5'-P-CCNC: 20 U/L (ref 9–39)
BASOPHILS # BLD AUTO: 0.03 X10*3/UL (ref 0–0.1)
BASOPHILS NFR BLD AUTO: 0.6 %
BILIRUB SERPL-MCNC: 0.6 MG/DL (ref 0–1.2)
BUN SERPL-MCNC: 27 MG/DL (ref 6–23)
CALCIUM SERPL-MCNC: 9.2 MG/DL (ref 8.6–10.3)
CHLORIDE SERPL-SCNC: 108 MMOL/L (ref 98–107)
CO2 SERPL-SCNC: 27 MMOL/L (ref 21–32)
CREAT SERPL-MCNC: 1.18 MG/DL (ref 0.5–1.05)
EGFRCR SERPLBLD CKD-EPI 2021: 49 ML/MIN/1.73M*2
EOSINOPHIL # BLD AUTO: 0.15 X10*3/UL (ref 0–0.4)
EOSINOPHIL NFR BLD AUTO: 2.8 %
ERYTHROCYTE [DISTWIDTH] IN BLOOD BY AUTOMATED COUNT: 12.9 % (ref 11.5–14.5)
GLUCOSE SERPL-MCNC: 103 MG/DL (ref 74–99)
HCT VFR BLD AUTO: 40.1 % (ref 36–46)
HGB BLD-MCNC: 12.5 G/DL (ref 12–16)
IMM GRANULOCYTES # BLD AUTO: 0.01 X10*3/UL (ref 0–0.5)
IMM GRANULOCYTES NFR BLD AUTO: 0.2 % (ref 0–0.9)
LYMPHOCYTES # BLD AUTO: 1.29 X10*3/UL (ref 0.8–3)
LYMPHOCYTES NFR BLD AUTO: 24.1 %
MCH RBC QN AUTO: 28.6 PG (ref 26–34)
MCHC RBC AUTO-ENTMCNC: 31.2 G/DL (ref 32–36)
MCV RBC AUTO: 92 FL (ref 80–100)
MONOCYTES # BLD AUTO: 0.45 X10*3/UL (ref 0.05–0.8)
MONOCYTES NFR BLD AUTO: 8.4 %
NEUTROPHILS # BLD AUTO: 3.42 X10*3/UL (ref 1.6–5.5)
NEUTROPHILS NFR BLD AUTO: 63.9 %
NRBC BLD-RTO: 0 /100 WBCS (ref 0–0)
PLATELET # BLD AUTO: 197 X10*3/UL (ref 150–450)
POTASSIUM SERPL-SCNC: 4.2 MMOL/L (ref 3.5–5.3)
PROT SERPL-MCNC: 6.5 G/DL (ref 6.4–8.2)
RBC # BLD AUTO: 4.37 X10*6/UL (ref 4–5.2)
SODIUM SERPL-SCNC: 141 MMOL/L (ref 136–145)
WBC # BLD AUTO: 5.4 X10*3/UL (ref 4.4–11.3)

## 2024-07-26 PROCEDURE — 99214 OFFICE O/P EST MOD 30 MIN: CPT | Performed by: STUDENT IN AN ORGANIZED HEALTH CARE EDUCATION/TRAINING PROGRAM

## 2024-07-26 PROCEDURE — 80053 COMPREHEN METABOLIC PANEL: CPT

## 2024-07-26 PROCEDURE — 36415 COLL VENOUS BLD VENIPUNCTURE: CPT

## 2024-07-26 PROCEDURE — 85025 COMPLETE CBC W/AUTO DIFF WBC: CPT

## 2024-07-26 NOTE — PROGRESS NOTES
Patient ID: Carolin Talamantes is a 74 y.o. female.    The patient presents to clinic today for her history of breast cancer.     Cancer Staging   Malignant neoplasm of central portion of right breast in female, estrogen receptor positive (Multi)  Staging form: Breast, AJCC 8th Edition  - Clinical: Stage IIB (cT3, cN1(f), cM0, G3, ER+, NH+, HER2+) - Signed by Morena Garrido DO on 4/23/2024        Diagnostic/Therapeutic History:    - 02/2024: breast pain followed by feeling a breast mass in her right breast    - 03/26/2024: follow up imaging on 03/26/2024 showed mass measuring 4cm at 12 oclock, 6 CFN    - 04/09/2024: US right breast showed a 5.1 cm mass at 11 oclock 5 CFN with at least 3 morphologically abnormal LN    - 04/09/2024: Right breast mass US guided core needle biopsy showed IDC G3 ER: 95%, NH: 20%, HER2 3+  with right axilla positive for metastatic carcinoma    -04/23/2024: 6 mm RLL nodule for which follow up is needed. No distant disease. Bone scan was negative.     - 05/06/2024: cycle 1 of phesgo and Anastrozole    - 05/28/2024: cycle 2 of phesgo and anastrozole    - 06/17/2024: cycle 3 of phesgo and anastrozole    - 07/09/2024: cycle 4 of phesgo and anastrozole    History of Present Illness (HPI)/Interval History:  Overall feels well. C5 HP today + compliant on daily anastrozole.     Does have diarrhea that is intermittent  Mention that scalp is sore.   No fever, no chills  She always has post-nasal drip, inside of nose feels raw.   No abdominal pain, no nausea, no vomiting  Feels mass is smaller.     PMH: Afib on eliquis and metoprolol    PSH: No surgery    Allergies: no allergies, GI intolerence to erythromycin    Social hx: no smoking, no alcohol    Family hx: no hx of cancer    Menarche at 15, menopause: 57, OCP x1 years,  No HRT      Review of Systems:  14-point ROS otherwise negative, as per HPI.    No past medical history on file.    No past surgical history on file.    Social History      Socioeconomic History    Marital status:    Tobacco Use    Smoking status: Never    Smokeless tobacco: Never   Substance and Sexual Activity    Alcohol use: Not Currently    Drug use: Never       Allergies   Allergen Reactions    Erythromycin GI Upset     Other reaction(s): GI Upset         Current Outpatient Medications:     anastrozole (Arimidex) 1 mg tablet, Take 1 tablet (1 mg total) by mouth once daily.  Swallow whole with a drink of water., Disp: 90 tablet, Rfl: 3    apixaban (Eliquis) 5 mg tablet, Take 1 tablet (5 mg) by mouth twice a day., Disp: , Rfl:     ashwagandha root extract 300 mg tablet, Take 1 tablet by mouth once daily., Disp: , Rfl:     calcium carbonate-vitamin D3 (Oyster Shell Calcium-Vit D3) 500 mg-5 mcg (200 unit) tablet, Take 1 tablet by mouth once daily., Disp: , Rfl:     cholecalciferol (Vitamin D-3) 25 MCG (1000 UT) capsule, Take 1 capsule (25 mcg) by mouth once daily., Disp: , Rfl:     co-enzyme Q-10 50 mg capsule, Take 2 capsules (100 mg) by mouth once daily., Disp: , Rfl:     garlic 500 mg capsule, Take 1 tablet by mouth once daily., Disp: , Rfl:     ginkgo biloba leaf extract 60 mg tablet, Take 1 tablet by mouth once daily., Disp: , Rfl:     glucosamine-chondroitin 500-400 mg tablet, Take 2 tablets by mouth once daily., Disp: , Rfl:     losartan (Cozaar) 25 mg tablet, Take 1 tablet (25 mg) by mouth once daily., Disp: 90 tablet, Rfl: 3    metoprolol succinate XL (Toprol-XL) 25 mg 24 hr tablet, Take 1 tablet (25 mg) by mouth once daily., Disp: 90 tablet, Rfl: 3    multivitamin tablet, Take 1 tablet by mouth once daily., Disp: , Rfl:     turmeric root extract 500 mg tablet, Take 1 tablet by mouth once daily., Disp: , Rfl:     UNABLE TO FIND, Take 1 tablet by mouth once daily. Magnesium 250mg, Disp: , Rfl:     UNABLE TO FIND, Take 1 tablet by mouth once daily. Fish Oil 1000mg, Disp: , Rfl:     UNABLE TO FIND, Take 1 tablet by mouth once daily. Susan Root, Disp: , Rfl:      vitamin E 180 mg (400 unit) capsule, Take 1 capsule (400 Units) by mouth once daily., Disp: , Rfl:      Objective    BSA: There is no height or weight on file to calculate BSA.  There were no vitals taken for this visit.    ECOG Performance Status: 1    Physical Exam  Vitals reviewed.   Constitutional:       General: She is not in acute distress.     Appearance: She is not toxic-appearing.   HENT:      Mouth/Throat:      Mouth: Mucous membranes are moist.      Pharynx: Oropharynx is clear.   Eyes:      General: No scleral icterus.     Extraocular Movements: Extraocular movements intact.      Conjunctiva/sclera: Conjunctivae normal.   Cardiovascular:      Rate and Rhythm: Normal rate and regular rhythm.      Heart sounds: Normal heart sounds.   Pulmonary:      Effort: Pulmonary effort is normal. No respiratory distress.      Breath sounds: Normal breath sounds.   Musculoskeletal:         General: No swelling.      Right lower leg: Tenderness (calf tenderness) present.      Right ankle: Swelling present.   Skin:     General: Skin is warm and dry.      Coloration: Skin is not jaundiced.   Neurological:      General: No focal deficit present.      Mental Status: She is alert and oriented to person, place, and time.   Psychiatric:         Mood and Affect: Mood normal.         Behavior: Behavior normal.         Thought Content: Thought content normal.         Judgment: Judgment normal.         Laboratory Data:  Lab Results   Component Value Date    WBC 5.6 07/09/2024    HGB 12.9 07/09/2024    HCT 40.6 07/09/2024    MCV 92 07/09/2024     07/09/2024       Chemistry    Lab Results   Component Value Date/Time     07/09/2024 1110    K 4.1 07/09/2024 1110     07/09/2024 1110    CO2 27 07/09/2024 1110    BUN 26 (H) 07/09/2024 1110    CREATININE 1.11 (H) 07/09/2024 1110    Lab Results   Component Value Date/Time    CALCIUM 9.9 07/09/2024 1110    ALKPHOS 98 07/09/2024 1110    AST 24 07/09/2024 1110    ALT 21  07/09/2024 1110    BILITOT 0.8 07/09/2024 1110             Radiology:  Transthoracic echo (TTE) [place] [address]  Phone [phone], Fax [phone]    TRANSTHORACIC ECHOCARDIOGRAM REPORT    Patient Name:      NEMO KRISHNAMURTHY       Reading Physician:    Meryl King MD  Study Date:        7/18/2024            Ordering Provider:    Meryl KING  MRN/PID:           [id number]             Fellow:  Accession#:        [id number]         Nurse:  Date of Birth/Age: 1950 / 74 years Sonographer:          KATELYNN Valle RVT  Gender:            F                    Additional Staff:  Height:            180.34 cm            Admit Date:  Weight:            92.99 kg             Admission Status:     Outpatient  BSA / BMI:         2.13 m2 / 28.59      Department Location:                     kg/m2  Blood Pressure: 136 /86 mmHg    Study Type:    TRANSTHORACIC ECHO (TTE) LIMITED  Diagnosis/ICD: Longstanding persistent AFib-I48.11; Encounter for monitoring                 cardiotoxic drug therapy-Z51.81  Indication:    AFib,EF  CPT Codes:     Echo Limited-61276    Patient History:  Pertinent History: Previous echo 7-20-23.    Study Detail: The following Echo studies were performed: 2D, M-Mode, Doppler and                color flow. A bubble study was not performed. The patient was                awake.       PHYSICIAN INTERPRETATION:  Left Ventricle: The left ventricular systolic function is mildly decreased, with a visually estimated ejection fraction of 45-50%. The patient is in atrial fibrillation which may influence the estimate of left ventricular function and transvalvular flows. There is global hypokinesis of the left ventricle with  minor regional variations. The left ventricular cavity size is normal. There is mild concentric left ventricular hypertrophy. Left ventricular diastolic filling was indeterminate.  Left Atrium: The left atrium is severely dilated.  Right Ventricle: The right ventricle is mildly enlarged. There is mildly reduced right ventricular systolic function.  Right Atrium: The right atrium is moderately dilated.  Aortic Valve: The aortic valve is trileaflet. The aortic valve dimensionless index is 0.77. There is no evidence of aortic valve regurgitation. The peak instantaneous gradient of the aortic valve is 6.4 mmHg. The mean gradient of the aortic valve is 3.0 mmHg.  Mitral Valve: The mitral valve is normal in structure. There is mild to moderate mitral valve regurgitation.  Tricuspid Valve: The tricuspid valve is structurally normal. There is mild tricuspid regurgitation.  Pulmonic Valve: The pulmonic valve is not well visualized. There is no indication of pulmonic valve regurgitation.  Pericardium: There is no pericardial effusion noted.  Aorta: The aortic root is normal.  In comparison to the previous echocardiogram(s): No major changes compared to prior echocardiogram dated 7/20/2023.       CONCLUSIONS:   1. The left ventricular systolic function is mildly decreased, with a visually estimated ejection fraction of 45-50%.   2. There is global hypokinesis of the left ventricle with minor regional variations.   3. Left ventricular diastolic filling was indeterminate.   4. Mildly enlarged right ventricle.   5. There is mildly reduced right ventricular systolic function.   6. Mild to moderate mitral valve regurgitation.   7. The left atrium is severely dilated.   8. The right atrium is moderately dilated.   9. The patient is in atrial fibrillation which may influence the estimate of left ventricular function and transvalvular flows.    QUANTITATIVE DATA SUMMARY:  2D MEASUREMENTS:                            Normal Ranges:  Ao  Root d:     2.60 cm    (2.0-3.7cm)  LAs:           4.10 cm    (2.7-4.0cm)  IVSd:          1.29 cm    (0.6-1.1cm)  LVPWd:         1.25 cm    (0.6-1.1cm)  LVIDd:         5.44 cm    (3.9-5.9cm)  LVIDs:         3.94 cm  LV Mass Index: 135.8 g/m2  LV % FS        27.6 %    LV SYSTOLIC FUNCTION BY 2D PLANIMETRY (MOD):                       Normal Ranges:  EF-A4C View:    62 % (>=55%)  EF-A2C View:    47 %  EF-Biplane:     54 %  EF-Visual:      48 %  LV EF Reported: 48 %    AORTIC VALVE:                                    Normal Ranges:  AoV Vmax:                1.26 m/s (<=1.7m/s)  AoV Peak P.4 mmHg (<20mmHg)  AoV Mean PG:             3.0 mmHg (1.7-11.5mmHg)  LVOT Max Alexis:            1.06 m/s (<=1.1m/s)  AoV VTI:                 24.40 cm (18-25cm)  LVOT VTI:                18.70 cm  LVOT Diameter:           2.20 cm  (1.8-2.4cm)  AoV Area, VTI:           2.91 cm2 (2.5-5.5cm2)  AoV Area,Vmax:           3.20 cm2 (2.5-4.5cm2)  AoV Dimensionless Index: 0.77       RIGHT VENTRICLE:  RV Basal 4.19 cm  RV Mid   2.57 cm  RV Major 6.8 cm    TRICUSPID VALVE/RVSP:                              Normal Ranges:  Peak TR Velocity: 2.57 m/s  RV Syst Pressure: 29.4 mmHg (< 30mmHg)       [id number] Bob Johnson MD  Electronically signed on 2024 at 2:55:48 PM       ** Final **       BI US breast limited right 2024    Narrative  Interpreted By:  Bernardo Damon,  STUDY:  BI US BREAST LIMITED RIGHT;  2024 2:03 pm    ACCESSION NUMBER(S):  [id number]    ORDERING CLINICIAN:  DAVID MCCRAY    INDICATION:  Repeat sonographic evaluation of the right breast and axilla was  performed prior to biopsy. Initially, patient declined repeat  mammogram, however she agreed that additional mammographic views  would be useful during her biopsy procedure in these were done during  her post clip imaging.    COMPARISON:  2024, 2024    FINDINGS:  Targeted ultrasound was performed of the right breast by a registered  sonographer  with elastography. In the right breast at 11 o'clock, 5  cm from the nipple, in the patient's area of palpable concern, there  is an irregular hypoechoic mass containing internal calcifications  measuring at least 5.1 x 2.5 x 4.2 cm. This is highly concerning for  malignancy. Scanning of the right axilla demonstrates at least 3  morphologically abnormal lymph nodes with cortical thickening.    Impression  Highly suspicious right breast mass with right axillary  lymphadenopathy. Biopsy will be subsequently performed. Additional  mammographic images were obtained at the time of post clip imaging.    BI-RADS CATEGORY:    BI-RADS CATEGORY:  5 Highly Suggestive of Malignancy.  Recommendation:  Surgical Consultation and Biopsy.  Recommended Date:  Immediate.  Laterality:  Right.    For any future breast imaging appointments, please call [phone]).      MACRO:  Bernardo Damon discussed the significance and urgency of this critical  finding in person with  DAVID MCCRAY on 4/9/2024 at 1 pm.  (**-RCF-**) Findings:  See findings.    Signed by: Bernardo Damon 4/9/2024 5:14 PM  Dictation workstation:   [id number]          Assessment/Plan:    74 year old female patient with hx of Afib on metoprolol and eliquis with newly diagnosed HER2 positive BC presenting to discuss treatment options    - 02/2024: breast pain followed by feeling a breast mass in her right breast    - 03/26/2024: follow up imaging on 03/26/2024 showed mass measuring 4cm at 12 oclock, 6 CFN    - 04/09/2024: US right breast showed a 5.1 cm mass at 11 oclock 5 CFN with at least 3 morphologically abnormal LN    - 04/09/2024: Right breast mass US guided core needle biopsy showed IDC G3 ER: 95%, NV: 20%, HER2 3+  with right axilla positive for metastatic carcinoma     I reviewed with her the events that led to her diagnosis of breast cancer. We reviewed all the procedures and diagnostic imaging she underwent thus far. I discussed the features of her breast  cancer that include the size, grade, lymph node status and  hormone receptor/ her2-bobby status. Per NCCN guidelines, for HER2 positive breast ca more than 2cm and/or +LN, the current recommendation would be neoadjuvant treatment with dual HER2  blockade as well as chemotherapy. The goal of treatment would be to  downstage her cancer as well as to assess tumor chemosensitivity (whether or not a pathologic complete response is achieved or not). Carolin does not want to pursue chemotherapy and we agreed on doing dual HER2 blockade only with pertuzumab and trastuzumab, with plan for 6 cycles followed by surgery.     Staging scans showed no distant disease  05/06/2024: cycle 1 of phesgo and Anastrozole  05/28/2024: cycle 2 of phesgo  06/17/2024: cycle 3 of phesgo and anastrozole  07/09/2024: cycle 4 of phesgo and anastrozole    Mild LV dysfunction 45-50%, long standing Afib: 24 hr Holter recommended. July 18, 2024 showed no changes compared to the prior echo     - Okay to proceed with cycle 5 of phesgo  - Grade 1-2 diarrhea: would like to continue rice and banana diet prn. Not interested in trying imodium at this time   - Recurrent candida: self-resolving with OTC treatment, offered nystatin but declined   - Grade 1 fatigue/arthralgias: would like to continue with anastrozole for now    RTC prior to cycle 6 (last cycle)     Cassy Whaley MD  Hematology and Medical Oncology  [facility]

## 2024-07-29 ENCOUNTER — INFUSION (OUTPATIENT)
Dept: HEMATOLOGY/ONCOLOGY | Facility: CLINIC | Age: 74
End: 2024-07-29
Payer: MEDICARE

## 2024-07-29 VITALS
OXYGEN SATURATION: 95 % | DIASTOLIC BLOOD PRESSURE: 93 MMHG | HEART RATE: 101 BPM | WEIGHT: 206.13 LBS | SYSTOLIC BLOOD PRESSURE: 135 MMHG | BODY MASS INDEX: 30.44 KG/M2 | TEMPERATURE: 97.9 F | RESPIRATION RATE: 18 BRPM

## 2024-07-29 DIAGNOSIS — Z17.0 MALIGNANT NEOPLASM OF CENTRAL PORTION OF RIGHT BREAST IN FEMALE, ESTROGEN RECEPTOR POSITIVE (MULTI): ICD-10-CM

## 2024-07-29 DIAGNOSIS — C50.111 MALIGNANT NEOPLASM OF CENTRAL PORTION OF RIGHT BREAST IN FEMALE, ESTROGEN RECEPTOR POSITIVE (MULTI): ICD-10-CM

## 2024-07-29 PROCEDURE — 2500000004 HC RX 250 GENERAL PHARMACY W/ HCPCS (ALT 636 FOR OP/ED): Mod: JZ | Performed by: STUDENT IN AN ORGANIZED HEALTH CARE EDUCATION/TRAINING PROGRAM

## 2024-07-29 PROCEDURE — 96401 CHEMO ANTI-NEOPL SQ/IM: CPT

## 2024-07-29 RX ORDER — LANOLIN ALCOHOL/MO/W.PET/CERES
500 CREAM (GRAM) TOPICAL DAILY
COMMUNITY

## 2024-07-29 RX ORDER — ALBUTEROL SULFATE 0.83 MG/ML
3 SOLUTION RESPIRATORY (INHALATION) AS NEEDED
Status: DISCONTINUED | OUTPATIENT
Start: 2024-07-29 | End: 2024-07-29 | Stop reason: HOSPADM

## 2024-07-29 RX ORDER — FAMOTIDINE 10 MG/ML
20 INJECTION INTRAVENOUS ONCE AS NEEDED
Status: DISCONTINUED | OUTPATIENT
Start: 2024-07-29 | End: 2024-07-29 | Stop reason: HOSPADM

## 2024-07-29 RX ORDER — PROCHLORPERAZINE MALEATE 10 MG
10 TABLET ORAL EVERY 6 HOURS PRN
Status: DISCONTINUED | OUTPATIENT
Start: 2024-07-29 | End: 2024-07-29 | Stop reason: HOSPADM

## 2024-07-29 RX ORDER — PROCHLORPERAZINE EDISYLATE 5 MG/ML
10 INJECTION INTRAMUSCULAR; INTRAVENOUS EVERY 6 HOURS PRN
Status: DISCONTINUED | OUTPATIENT
Start: 2024-07-29 | End: 2024-07-29 | Stop reason: HOSPADM

## 2024-07-29 RX ORDER — FERROUS SULFATE 325(65) MG
65 TABLET ORAL
COMMUNITY

## 2024-07-29 RX ORDER — EPINEPHRINE 0.3 MG/.3ML
0.3 INJECTION SUBCUTANEOUS EVERY 5 MIN PRN
Status: DISCONTINUED | OUTPATIENT
Start: 2024-07-29 | End: 2024-07-29 | Stop reason: HOSPADM

## 2024-07-29 RX ORDER — DIPHENHYDRAMINE HYDROCHLORIDE 50 MG/ML
50 INJECTION INTRAMUSCULAR; INTRAVENOUS AS NEEDED
Status: DISCONTINUED | OUTPATIENT
Start: 2024-07-29 | End: 2024-07-29 | Stop reason: HOSPADM

## 2024-07-29 ASSESSMENT — PAIN SCALES - GENERAL: PAINLEVEL: 0-NO PAIN

## 2024-07-30 ENCOUNTER — TELEPHONE (OUTPATIENT)
Dept: CARDIOLOGY | Facility: CLINIC | Age: 74
End: 2024-07-30
Payer: MEDICARE

## 2024-07-30 NOTE — TELEPHONE ENCOUNTER
----- Message from Camille Green sent at 7/30/2024  7:47 AM EDT -----  Holter monitor showed a fib throughout the whole study but heart rates were acceptably controlled.  ----- Message -----  From: Bob Johnson MD  Sent: 7/29/2024   9:54 AM EDT  To: Camille Green MD

## 2024-07-31 DIAGNOSIS — C50.111 MALIGNANT NEOPLASM OF CENTRAL PORTION OF RIGHT BREAST IN FEMALE, ESTROGEN RECEPTOR POSITIVE (MULTI): ICD-10-CM

## 2024-07-31 DIAGNOSIS — Z17.0 MALIGNANT NEOPLASM OF CENTRAL PORTION OF RIGHT BREAST IN FEMALE, ESTROGEN RECEPTOR POSITIVE (MULTI): ICD-10-CM

## 2024-08-02 ENCOUNTER — TELEPHONE (OUTPATIENT)
Dept: ADMISSION | Facility: HOSPITAL | Age: 74
End: 2024-08-02
Payer: MEDICARE

## 2024-08-02 NOTE — TELEPHONE ENCOUNTER
She said she was going to find the labs she needs ordered and call us back or send you a my chart message with all the labs that need ordered. I will let her know you are willing to place lab orders.

## 2024-08-02 NOTE — TELEPHONE ENCOUNTER
The fax number to [facility] is [phone]. Please fax the orders also besides putting it in EPIC per [facility]'s lab.

## 2024-08-02 NOTE — TELEPHONE ENCOUNTER
Carolin called nurse line and said she talked to Dr. Whaley about seeing a nature pathic route at a place called [facility]. She was told  also has integrative medicine but she chose to go outside  since was able to get appointment quick at [facility].  She said they want to order a nutrition panel and some other labs but if she gets done there insurance doesn't pay for the labs. She is wanting to know if Dr. Whaley would be willing to place orders for what will be needed so insurance will cover it if gets done at ? She said she would follow-up today or Monday and send a my chart message of the labs they want ordered. Messaged team.

## 2024-08-02 NOTE — TELEPHONE ENCOUNTER
I called patient and told her Dr. Whaley will order labs. She said while I'm on phone she will pull up email with orders now. She will get labs done at [facility]. Please fax them. I am calling her later to get [facility]'s fax number.     Labs that need ordered:  Hemoglobin A1C  Thyroxine Free T4  T4  Ferritin  Iron and TIBC  ESR  T3  TSH  T3 uptake  GGT-gamma-glutamyl transferase  LD/LDH  LD by itself   Uric Acic  Lactate   Glutathione  Co Q-10  G6PD  Alkaline phosphatase iso enzymes  D-Dimer  Reverse T3  IGF-1   Homocysteine  Vit D, 25 Hydroxy  Copper serum or plasma  Dhea-S  LD iso enzymes  Thyroid antibodies  (Thyroglobulin antibody) (Thyroid peroxidase Antibody)  CRP-high sensitivity  A.M cortisol level  Ceruloplasmin  Insulin  T3 Free  Fibrinogen activity  Lipid Panel  VEGF  Galectin-3

## 2024-08-05 DIAGNOSIS — M94.9 DISORDER OF CARTILAGE, UNSPECIFIED: ICD-10-CM

## 2024-08-05 DIAGNOSIS — C50.111 MALIGNANT NEOPLASM OF CENTRAL PORTION OF RIGHT BREAST IN FEMALE, ESTROGEN RECEPTOR POSITIVE (MULTI): Primary | ICD-10-CM

## 2024-08-05 DIAGNOSIS — R79.9 ABNORMAL FINDING OF BLOOD CHEMISTRY, UNSPECIFIED: ICD-10-CM

## 2024-08-05 DIAGNOSIS — Z13.6 ENCOUNTER FOR SCREENING FOR CARDIOVASCULAR DISORDERS: ICD-10-CM

## 2024-08-05 DIAGNOSIS — R63.5 ABNORMAL WEIGHT GAIN: ICD-10-CM

## 2024-08-05 DIAGNOSIS — Z17.0 MALIGNANT NEOPLASM OF CENTRAL PORTION OF RIGHT BREAST IN FEMALE, ESTROGEN RECEPTOR POSITIVE (MULTI): Primary | ICD-10-CM

## 2024-08-05 NOTE — TELEPHONE ENCOUNTER
I called Carolin back and let her know Dr. Whaley placed lab orders and his nurse will fax to [facility]. I also told her to check with her insurance again to see what is covered. She understands she needs to check first with insurance to see what is covered or she may have to pay out of pocket. She had no further questions at this time. She did say Thank you to Dr. Whaley for placing the orders.

## 2024-08-05 NOTE — TELEPHONE ENCOUNTER
Dr. Whaley,  Please let me know after seeing all these labs that need ordered if you are still able to do this or not? Thanks.

## 2024-08-05 NOTE — TELEPHONE ENCOUNTER
Carolin called back nurse line and said she is supposed to get the labs drawn tomorrow if orders can be placed today sometime. I told her once orders are in I can call her back to let her know. She said [facility] also wants the orders faxed over to [phone] once orders are placed. Thank you.

## 2024-08-05 NOTE — TELEPHONE ENCOUNTER
Thank you Dr. Whaley and for getting them faxed. I did tell her on my previous call to check with insurance first since placed by Dr. Whaley doesn't mean insurance will not deny some of these or most of these labs. I will call her and let her know they were placed and talk about insurance again. Thank you.

## 2024-08-06 ENCOUNTER — LAB (OUTPATIENT)
Dept: LAB | Facility: LAB | Age: 74
End: 2024-08-06
Payer: MEDICARE

## 2024-08-08 ENCOUNTER — LAB (OUTPATIENT)
Dept: LAB | Facility: LAB | Age: 74
End: 2024-08-08
Payer: MEDICARE

## 2024-08-08 DIAGNOSIS — R79.9 ABNORMAL FINDING OF BLOOD CHEMISTRY, UNSPECIFIED: ICD-10-CM

## 2024-08-08 DIAGNOSIS — Z13.6 ENCOUNTER FOR SCREENING FOR CARDIOVASCULAR DISORDERS: ICD-10-CM

## 2024-08-08 DIAGNOSIS — Z17.0 MALIGNANT NEOPLASM OF CENTRAL PORTION OF RIGHT BREAST IN FEMALE, ESTROGEN RECEPTOR POSITIVE (MULTI): ICD-10-CM

## 2024-08-08 DIAGNOSIS — C50.111 MALIGNANT NEOPLASM OF CENTRAL PORTION OF RIGHT BREAST IN FEMALE, ESTROGEN RECEPTOR POSITIVE (MULTI): ICD-10-CM

## 2024-08-08 DIAGNOSIS — M94.9 DISORDER OF CARTILAGE, UNSPECIFIED: ICD-10-CM

## 2024-08-08 DIAGNOSIS — R63.5 ABNORMAL WEIGHT GAIN: ICD-10-CM

## 2024-08-08 LAB
25(OH)D3 SERPL-MCNC: 26 NG/ML (ref 30–100)
CERULOPLASMIN SERPL-MCNC: 26 MG/DL (ref 20–60)
CHOLEST SERPL-MCNC: 221 MG/DL (ref 0–199)
CHOLESTEROL/HDL RATIO: 4.7
CORTIS SERPL-MCNC: 19.2 UG/DL (ref 2.5–20)
CRP SERPL-MCNC: <0.1 MG/DL
D DIMER PPP FEU-MCNC: 260 NG/ML FEU
DHEA-S SERPL-MCNC: 67 UG/DL (ref 13–130)
ERYTHROCYTE [SEDIMENTATION RATE] IN BLOOD BY WESTERGREN METHOD: 9 MM/H (ref 0–30)
EST. AVERAGE GLUCOSE BLD GHB EST-MCNC: 117 MG/DL
FERRITIN SERPL-MCNC: 72 NG/ML (ref 8–150)
FIBRINOGEN PPP-MCNC: 331 MG/DL (ref 200–400)
FT4I SERPL CALC-MCNC: 2.6 (ref 1.6–4.7)
GGT SERPL-CCNC: 9 U/L (ref 5–55)
HBA1C MFR BLD: 5.7 %
HCYS SERPL-SCNC: 12.99 UMOL/L (ref 5–13.9)
HDLC SERPL-MCNC: 47 MG/DL
IRON SATN MFR SERPL: 23 % (ref 25–45)
IRON SERPL-MCNC: 77 UG/DL (ref 35–150)
LACTATE SERPL-SCNC: <0.2 MMOL/L (ref 0.4–2)
LDH SERPL L TO P-CCNC: 190 U/L (ref 84–246)
LDLC SERPL CALC-MCNC: 148 MG/DL
NON HDL CHOLESTEROL: 174 MG/DL (ref 0–149)
T3 SERPL-MCNC: 140 NG/DL (ref 60–200)
T3FREE SERPL-MCNC: 3.5 PG/ML (ref 2.3–4.2)
T3RU NFR SERPL: 36 % (ref 24–41)
T4 FREE SERPL-MCNC: 0.93 NG/DL (ref 0.61–1.12)
T4 SERPL-MCNC: 7.3 UG/DL (ref 4.5–11.1)
THYROPEROXIDASE AB SERPL-ACNC: <28 IU/ML
TIBC SERPL-MCNC: 342 UG/DL (ref 240–445)
TRIGL SERPL-MCNC: 128 MG/DL (ref 0–149)
TSH SERPL-ACNC: 5.18 MIU/L (ref 0.44–3.98)
UIBC SERPL-MCNC: 265 UG/DL (ref 110–370)
URATE SERPL-MCNC: 3.2 MG/DL (ref 2.3–6.7)
VLDL: 26 MG/DL (ref 0–40)

## 2024-08-08 PROCEDURE — 84436 ASSAY OF TOTAL THYROXINE: CPT

## 2024-08-08 PROCEDURE — 83520 IMMUNOASSAY QUANT NOS NONAB: CPT

## 2024-08-08 PROCEDURE — 84305 ASSAY OF SOMATOMEDIN: CPT

## 2024-08-08 PROCEDURE — 82525 ASSAY OF COPPER: CPT

## 2024-08-08 PROCEDURE — 83527 ASSAY OF INSULIN: CPT

## 2024-08-08 PROCEDURE — 83036 HEMOGLOBIN GLYCOSYLATED A1C: CPT

## 2024-08-08 PROCEDURE — 83090 ASSAY OF HOMOCYSTEINE: CPT

## 2024-08-08 PROCEDURE — 85652 RBC SED RATE AUTOMATED: CPT

## 2024-08-08 PROCEDURE — 84432 ASSAY OF THYROGLOBULIN: CPT

## 2024-08-08 PROCEDURE — 84479 ASSAY OF THYROID (T3 OR T4): CPT

## 2024-08-08 PROCEDURE — 84443 ASSAY THYROID STIM HORMONE: CPT

## 2024-08-08 PROCEDURE — 84480 ASSAY TRIIODOTHYRONINE (T3): CPT

## 2024-08-08 PROCEDURE — 82977 ASSAY OF GGT: CPT

## 2024-08-08 PROCEDURE — 82960 TEST FOR G6PD ENZYME: CPT

## 2024-08-08 PROCEDURE — 82306 VITAMIN D 25 HYDROXY: CPT

## 2024-08-08 PROCEDURE — 83540 ASSAY OF IRON: CPT

## 2024-08-08 PROCEDURE — 80061 LIPID PANEL: CPT

## 2024-08-08 PROCEDURE — 84439 ASSAY OF FREE THYROXINE: CPT

## 2024-08-08 PROCEDURE — 36415 COLL VENOUS BLD VENIPUNCTURE: CPT

## 2024-08-08 PROCEDURE — 84550 ASSAY OF BLOOD/URIC ACID: CPT

## 2024-08-08 PROCEDURE — 83525 ASSAY OF INSULIN: CPT

## 2024-08-08 PROCEDURE — 83605 ASSAY OF LACTIC ACID: CPT

## 2024-08-08 PROCEDURE — 86800 THYROGLOBULIN ANTIBODY: CPT

## 2024-08-08 PROCEDURE — 85384 FIBRINOGEN ACTIVITY: CPT

## 2024-08-08 PROCEDURE — 84482 T3 REVERSE: CPT

## 2024-08-08 PROCEDURE — 83615 LACTATE (LD) (LDH) ENZYME: CPT

## 2024-08-08 PROCEDURE — 82390 ASSAY OF CERULOPLASMIN: CPT

## 2024-08-08 PROCEDURE — 82728 ASSAY OF FERRITIN: CPT

## 2024-08-08 PROCEDURE — 83550 IRON BINDING TEST: CPT

## 2024-08-08 PROCEDURE — 82627 DEHYDROEPIANDROSTERONE: CPT

## 2024-08-08 PROCEDURE — 85379 FIBRIN DEGRADATION QUANT: CPT

## 2024-08-08 PROCEDURE — 82533 TOTAL CORTISOL: CPT

## 2024-08-08 PROCEDURE — 84481 FREE ASSAY (FT-3): CPT

## 2024-08-08 PROCEDURE — 86376 MICROSOMAL ANTIBODY EACH: CPT

## 2024-08-08 PROCEDURE — 84080 ASSAY ALKALINE PHOSPHATASES: CPT

## 2024-08-08 PROCEDURE — 86140 C-REACTIVE PROTEIN: CPT

## 2024-08-08 NOTE — TELEPHONE ENCOUNTER
Pt called regarding these labs- she had them done today at [facility]. However, she said she thinks she needs them done again in a few months but wasn't sure when. She said [facility] likes to have the faxed copy of the orders also. I asked her to call her integrative MD to see when the labs needed done again and let us know. Once she gives us a timeframe we can let Dr. Whaley know.

## 2024-08-09 LAB — G6PD RBC QL: NORMAL

## 2024-08-09 NOTE — PROGRESS NOTES
BREAST SURGICAL ONCOLOGY FOLLOW UP AFTER NEOADJUVANT THERAPY    Assessment/Plan     Right breast IDC g3 ER 95% AK 20% HER2+ at 11:00 5cmFN measuring 5.1cm on imaging with biopsy proven axillary sabino metatasis  -[id number] stage IIB      2.  Atrial fibrillation on Eliquis         -maintained anticoagulation for biopsies, but would need to stop anticoagulation for surgical intervention.  May require bridging with lovenox pending risk assessment.  Defer perioperative prophylaxis to prescribing physician.       We reviewed treatment course to date and most recent imaging.  Good sabino response.  Mass is smaller on imaging and exam.  Calcifications span 7.3cm.     Cycle 6 of HP scheduled 8/20/2024.    Surgical intervention would ideally be scheduled 4-6 weeks after chemotherapy.    We discussed the footprint of disease and calcifications spanning >7cm I would recommend a mastectomy.  I do have concerns breast conservation would result in breast asymmetry and likely result in a defect to the breast mound.  Carolin has disclosed she would still be most interested in breast conservation despite any asymmetry.  I did review the imaging with radiology and discussed magseed bracketing for breast conservation per patient request.  We also discussed placing a magseed in the previously biopsied lymph node.     Carolin has several concerns regarding surgery. 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  She concerns interventions will leave her in an impaired condition were she is unable to care for her .  She is worried about discontinuing anticoagulation due to stroke risk with atrial fibrillation.  Have asked her to speak with cardiology re: bridging in the perioperative period to help minimize bleeding risk.    She is also concerned about lymphedema related to axillary surgery and has inquired if she needs axillary surgery.  Axillary staging is standard of care with a breast cancer  diagnosis. Additionally, she had several positive lymph nodes at the time of diagnosis and it would be critical to evaluate the braeden response to systemic therapy.  We discussed magseed localized sentinel lymph node biopsy with the recommendation for braeden dissection if there were residual braeden disease.    Carolin also discloses some concern with the timing of surgery.  We discussed ideally, surgery is planned 4-6 weeks after her last treatment and offered potential surgical dates 9/20 and 9/26.  She has some competing issues she needs to deal with and this timing is not ideal for her.  I proposed discussing continuing systemic therapy until timing was more favorable for her.  Unfortunately, at this time she does not have a clear timeline for surgical timing.  She also disclosed concerns about her heart function and continuing systemic therapy.  She has an appointment with Dr. Whaley to further discuss options.    We did not schedule surgery at this appointment.  Carolin would like more time to think over her options and speak with Dr. Whaley.  She knows we will be reaching out next week for further discussion on treatment.     Subjective   Carolin Talamantes is a 74 y.o. female presents today for follow up carcinoma of the right breast after neoadjuvant therapy.    Cancer history  Pathology: ductal thGthrthathdtheth:th th4th ER: 95% RI: 20% HER2: positive  Size: 5.1cm  Multifocal: No  Braeden involvement: Yes, describe: at least 3 abnormal nodes on imaging  Systemic therapy: she is on neoadjuvant herceptin/perjeta and anastrozole x 6 cycles.  Genetic testing performed: No    Imaging response to treatment: decrease in size of tumor on U/S.  Mass at 11:00 5cmFN 2.5cm x 0.9cm x 2.1cm.  Oblique measurement 4.4cm.  All 3 previously abnormal right axillary nodes with normal morphology on today's exam.  Lymph node #1 with tissue marker.     Review of Systems   Constitutional symptoms: Denies generalized fatigue.  Denies weight change,  fevers/chills, difficulty sleeping   Eyes: Denies double vision, glaucoma, cataracts.  Ear/nose/throat/mouth: Denies hearing changes, sore throat, sinus problems.  Cardiovascular: No chest pain.  Denies irregular heartbeat.  Denies ankle swelling.  Respiratory: No wheezing, cough, or shortness of breath.  Gastrointestinal: No abdominal pain,  No nausea/vomiting.  No indigestion/heartburn.  No change in bowel habits.  No constipation or diarrhea.   Genitourinary: No urinary incontinence.  No urinary frequency.  No painful urination.  Musculoskeletal: No bone pain, no muscle pain, no joint pain.   Integumentary: No rash. No masses.  No changes in moles.  No easy bruising.  Neurological: No headaches.  No tremors. No numbness/tingling.  Psychiatric: No anxiety. No depression.  Endocrine: No excessive thirst.  Not too hot or too cold.  Not tired or fatigued.    Hematological/lymphatic: No swollen glands or blood clotting problems.  No bruising.    Objective   Physical Exam  General: Alert and oriented x 3.  Mood and affect are appropriate.  HEENT: EOMI, PERRLA.   Neck: supple, no masses, no cervical adenopathy.  Cardiovascular: no lower extremity edema.  Pulmonary: breathing non labored on room air.  GI: Abdomen soft, no masses. No hepatomegaly or splenomegaly.  Lymph nodes: No supraclavicular or axillary adenopathy on the left.  No anormal nodes appreciated on exam in the right axilla.  Musculoskeletal: Full range of motion in the upper extremities bilaterally.  Neuro: denies dizziness, tremors    Breasts: The breasts were examined in both the seated and supine positions.    RIGHT: The nipple is everted without nipple discharge.  No erythema or edema of skin.  It is difficult to palpate a mass in the right breast at 11:00 today.  LEFT: The nipple is everted without nipple discharge.  There are no skin changes, skin thickening, or dimpling. There are no masses palpated in the LEFT breast.       Radiology review: All  images and reports were personally reviewed.       Morena Garrido, DO

## 2024-08-10 LAB
BILL ONLY-THYROGLOBULIN: NORMAL
INSULIN FREE SERPL-ACNC: 8 UIU/ML (ref 3–25)
INSULIN SERPL-ACNC: 10 UIU/ML (ref 3–25)
THYROGLOB AB SERPL-ACNC: <0.9 IU/ML (ref 0–4)
THYROGLOB SERPL-MCNC: 10.8 NG/ML (ref 1.3–31.8)
THYROGLOB SERPL-MCNC: NORMAL NG/ML (ref 1.3–31.8)

## 2024-08-11 LAB
COPPER SERPL-MCNC: 96.3 UG/DL (ref 80–155)
IGF-I SERPL-MCNC: 152 NG/ML (ref 24–222)
IGF-I Z-SCORE SERPL: 1

## 2024-08-12 ENCOUNTER — HOSPITAL ENCOUNTER (OUTPATIENT)
Dept: RADIOLOGY | Facility: HOSPITAL | Age: 74
Discharge: HOME | End: 2024-08-12
Payer: MEDICARE

## 2024-08-12 ENCOUNTER — OFFICE VISIT (OUTPATIENT)
Dept: SURGICAL ONCOLOGY | Facility: HOSPITAL | Age: 74
End: 2024-08-12
Payer: MEDICARE

## 2024-08-12 VITALS
SYSTOLIC BLOOD PRESSURE: 142 MMHG | BODY MASS INDEX: 30.51 KG/M2 | WEIGHT: 206 LBS | HEIGHT: 69 IN | RESPIRATION RATE: 18 BRPM | DIASTOLIC BLOOD PRESSURE: 80 MMHG | HEART RATE: 100 BPM

## 2024-08-12 VITALS — BODY MASS INDEX: 28.14 KG/M2 | HEIGHT: 71 IN | WEIGHT: 201 LBS

## 2024-08-12 DIAGNOSIS — C50.111 MALIGNANT NEOPLASM OF CENTRAL PORTION OF RIGHT BREAST IN FEMALE, ESTROGEN RECEPTOR POSITIVE (MULTI): ICD-10-CM

## 2024-08-12 DIAGNOSIS — C50.111 MALIGNANT NEOPLASM OF CENTRAL PORTION OF RIGHT BREAST IN FEMALE, ESTROGEN RECEPTOR POSITIVE (MULTI): Primary | ICD-10-CM

## 2024-08-12 DIAGNOSIS — Z17.0 MALIGNANT NEOPLASM OF CENTRAL PORTION OF RIGHT BREAST IN FEMALE, ESTROGEN RECEPTOR POSITIVE (MULTI): ICD-10-CM

## 2024-08-12 DIAGNOSIS — Z17.0 MALIGNANT NEOPLASM OF CENTRAL PORTION OF RIGHT BREAST IN FEMALE, ESTROGEN RECEPTOR POSITIVE (MULTI): Primary | ICD-10-CM

## 2024-08-12 LAB
ALP BONE SERPL-CCNC: 48 U/L (ref 0–55)
ALP LIVER SERPL-CCNC: 64 U/L (ref 0–94)
ALP OTHER SERPL-CCNC: 0 U/L
ALP SERPL-CCNC: 112 U/L (ref 40–120)
T3REVERSE SERPL-MCNC: 17.7 NG/DL (ref 9–27)

## 2024-08-12 PROCEDURE — G0279 TOMOSYNTHESIS, MAMMO: HCPCS | Mod: RIGHT SIDE | Performed by: STUDENT IN AN ORGANIZED HEALTH CARE EDUCATION/TRAINING PROGRAM

## 2024-08-12 PROCEDURE — 76642 ULTRASOUND BREAST LIMITED: CPT | Mod: RIGHT SIDE | Performed by: STUDENT IN AN ORGANIZED HEALTH CARE EDUCATION/TRAINING PROGRAM

## 2024-08-12 PROCEDURE — 99215 OFFICE O/P EST HI 40 MIN: CPT | Performed by: SURGERY

## 2024-08-12 PROCEDURE — 77061 BREAST TOMOSYNTHESIS UNI: CPT | Mod: RT

## 2024-08-12 PROCEDURE — 1159F MED LIST DOCD IN RCRD: CPT | Performed by: SURGERY

## 2024-08-12 PROCEDURE — 76642 ULTRASOUND BREAST LIMITED: CPT | Mod: RT

## 2024-08-12 PROCEDURE — 3008F BODY MASS INDEX DOCD: CPT | Performed by: SURGERY

## 2024-08-12 PROCEDURE — 77065 DX MAMMO INCL CAD UNI: CPT | Mod: RIGHT SIDE | Performed by: STUDENT IN AN ORGANIZED HEALTH CARE EDUCATION/TRAINING PROGRAM

## 2024-08-12 PROCEDURE — 76982 USE 1ST TARGET LESION: CPT | Mod: RT

## 2024-08-13 LAB — VEGF SERPL-MCNC: 28 PG/ML (ref 9–86)

## 2024-08-15 ENCOUNTER — LAB (OUTPATIENT)
Dept: LAB | Facility: LAB | Age: 74
End: 2024-08-15
Payer: MEDICARE

## 2024-08-15 DIAGNOSIS — C50.111 MALIGNANT NEOPLASM OF CENTRAL PORTION OF RIGHT BREAST IN FEMALE, ESTROGEN RECEPTOR POSITIVE (MULTI): ICD-10-CM

## 2024-08-15 DIAGNOSIS — Z17.0 MALIGNANT NEOPLASM OF CENTRAL PORTION OF RIGHT BREAST IN FEMALE, ESTROGEN RECEPTOR POSITIVE (MULTI): ICD-10-CM

## 2024-08-15 LAB
ALBUMIN SERPL BCP-MCNC: 4.4 G/DL (ref 3.4–5)
ALP SERPL-CCNC: 90 U/L (ref 33–136)
ALT SERPL W P-5'-P-CCNC: 22 U/L (ref 7–45)
ANION GAP SERPL CALC-SCNC: 8 MMOL/L (ref 10–20)
AST SERPL W P-5'-P-CCNC: 24 U/L (ref 9–39)
BASOPHILS # BLD AUTO: 0.04 X10*3/UL (ref 0–0.1)
BASOPHILS NFR BLD AUTO: 0.7 %
BILIRUB SERPL-MCNC: 0.7 MG/DL (ref 0–1.2)
BUN SERPL-MCNC: 33 MG/DL (ref 6–23)
CALCIUM SERPL-MCNC: 9.5 MG/DL (ref 8.6–10.3)
CHLORIDE SERPL-SCNC: 106 MMOL/L (ref 98–107)
CO2 SERPL-SCNC: 28 MMOL/L (ref 21–32)
CREAT SERPL-MCNC: 1.26 MG/DL (ref 0.5–1.05)
EGFRCR SERPLBLD CKD-EPI 2021: 45 ML/MIN/1.73M*2
EOSINOPHIL # BLD AUTO: 0.07 X10*3/UL (ref 0–0.4)
EOSINOPHIL NFR BLD AUTO: 1.2 %
ERYTHROCYTE [DISTWIDTH] IN BLOOD BY AUTOMATED COUNT: 13.2 % (ref 11.5–14.5)
GLUCOSE SERPL-MCNC: 106 MG/DL (ref 74–99)
HCT VFR BLD AUTO: 39 % (ref 36–46)
HGB BLD-MCNC: 12.2 G/DL (ref 12–16)
IMM GRANULOCYTES # BLD AUTO: 0.01 X10*3/UL (ref 0–0.5)
IMM GRANULOCYTES NFR BLD AUTO: 0.2 % (ref 0–0.9)
LYMPHOCYTES # BLD AUTO: 1.4 X10*3/UL (ref 0.8–3)
LYMPHOCYTES NFR BLD AUTO: 23.9 %
MCH RBC QN AUTO: 29.3 PG (ref 26–34)
MCHC RBC AUTO-ENTMCNC: 31.3 G/DL (ref 32–36)
MCV RBC AUTO: 94 FL (ref 80–100)
MONOCYTES # BLD AUTO: 0.39 X10*3/UL (ref 0.05–0.8)
MONOCYTES NFR BLD AUTO: 6.6 %
NEUTROPHILS # BLD AUTO: 3.96 X10*3/UL (ref 1.6–5.5)
NEUTROPHILS NFR BLD AUTO: 67.4 %
NRBC BLD-RTO: 0 /100 WBCS (ref 0–0)
PLATELET # BLD AUTO: 213 X10*3/UL (ref 150–450)
POTASSIUM SERPL-SCNC: 4.1 MMOL/L (ref 3.5–5.3)
PROT SERPL-MCNC: 6.6 G/DL (ref 6.4–8.2)
RBC # BLD AUTO: 4.17 X10*6/UL (ref 4–5.2)
SODIUM SERPL-SCNC: 138 MMOL/L (ref 136–145)
WBC # BLD AUTO: 5.9 X10*3/UL (ref 4.4–11.3)

## 2024-08-15 PROCEDURE — 80053 COMPREHEN METABOLIC PANEL: CPT

## 2024-08-15 PROCEDURE — 36415 COLL VENOUS BLD VENIPUNCTURE: CPT

## 2024-08-15 PROCEDURE — 85025 COMPLETE CBC W/AUTO DIFF WBC: CPT

## 2024-08-20 ENCOUNTER — APPOINTMENT (OUTPATIENT)
Dept: HEMATOLOGY/ONCOLOGY | Facility: CLINIC | Age: 74
End: 2024-08-20
Payer: MEDICARE

## 2024-08-20 ENCOUNTER — TELEPHONE (OUTPATIENT)
Dept: HEMATOLOGY/ONCOLOGY | Facility: CLINIC | Age: 74
End: 2024-08-20
Payer: MEDICARE

## 2024-08-20 NOTE — TELEPHONE ENCOUNTER
Contacted patient regarding patient cancelled treatment and follow up visit that were scheduled for 8/20, provided scheduling line number for patient to reschedule voicemail left

## 2024-08-21 ENCOUNTER — TELEPHONE (OUTPATIENT)
Dept: SURGICAL ONCOLOGY | Facility: HOSPITAL | Age: 74
End: 2024-08-21
Payer: MEDICARE

## 2024-08-21 NOTE — TELEPHONE ENCOUNTER
Left a detailed message for Ms. Talamantes to contact the office in regards to proceeding with next steps in treatment.

## 2024-08-28 ENCOUNTER — TELEPHONE (OUTPATIENT)
Dept: HEMATOLOGY/ONCOLOGY | Facility: HOSPITAL | Age: 74
End: 2024-08-28
Payer: MEDICARE

## 2024-08-28 DIAGNOSIS — E55.9 VITAMIN D DEFICIENCY, UNSPECIFIED: ICD-10-CM

## 2024-08-28 DIAGNOSIS — Z79.899 ENCOUNTER FOR MONITORING CARDIOTOXIC DRUG THERAPY: ICD-10-CM

## 2024-08-28 DIAGNOSIS — C50.111 MALIGNANT NEOPLASM OF CENTRAL PORTION OF RIGHT BREAST IN FEMALE, ESTROGEN RECEPTOR POSITIVE (MULTI): Primary | ICD-10-CM

## 2024-08-28 DIAGNOSIS — I51.9 RADIATION INDUCED CARDIOTOXICITY: ICD-10-CM

## 2024-08-28 DIAGNOSIS — Z17.0 MALIGNANT NEOPLASM OF CENTRAL PORTION OF RIGHT BREAST IN FEMALE, ESTROGEN RECEPTOR POSITIVE (MULTI): Primary | ICD-10-CM

## 2024-08-28 DIAGNOSIS — E03.8 TSH (THYROID-STIMULATING HORMONE DEFICIENCY): ICD-10-CM

## 2024-08-28 DIAGNOSIS — E78.2 MODERATE MIXED HYPERLIPIDEMIA NOT REQUIRING STATIN THERAPY: ICD-10-CM

## 2024-08-28 DIAGNOSIS — Z51.81 ENCOUNTER FOR MONITORING CARDIOTOXIC DRUG THERAPY: ICD-10-CM

## 2024-08-28 NOTE — TELEPHONE ENCOUNTER
Patient had follow-up with her Naturopathic doctor she is seeing (Dr. Bennett) adjustmetns were made to her nutrition, and he wpould like to repeat some labs to follow-up on some of the labs ordered 8/8. The labs she would like Dr. Whaley to order are:  Lipid panel  Cortisol AM  Thyro globin antibody  Vit D 25 Hydroxy  TSH  Free T4  Total T4  T3 uptake  Free T3  Reverse T3  T3(triiodothyronine)  ESR  CRP-High Sensitivity  CBC  CMP Ferritin  Fibrinogen  GGT  Homocysteine  Insulin total  LDH  IGF-1  somatomedin  If Dr. Whaley agreeable to order again, she would like to go to [facility], order may have to be faxed

## 2024-08-30 ENCOUNTER — TELEPHONE (OUTPATIENT)
Dept: HEMATOLOGY/ONCOLOGY | Facility: HOSPITAL | Age: 74
End: 2024-08-30
Payer: MEDICARE

## 2024-09-10 ENCOUNTER — TELEPHONE (OUTPATIENT)
Dept: ADMISSION | Facility: HOSPITAL | Age: 74
End: 2024-09-10

## 2024-09-10 ENCOUNTER — TELEMEDICINE (OUTPATIENT)
Dept: HEMATOLOGY/ONCOLOGY | Facility: CLINIC | Age: 74
End: 2024-09-10
Payer: MEDICARE

## 2024-09-10 ENCOUNTER — TELEPHONE (OUTPATIENT)
Dept: ADMISSION | Facility: HOSPITAL | Age: 74
End: 2024-09-10
Payer: MEDICARE

## 2024-09-10 DIAGNOSIS — C50.111 MALIGNANT NEOPLASM OF CENTRAL PORTION OF RIGHT BREAST IN FEMALE, ESTROGEN RECEPTOR POSITIVE (MULTI): ICD-10-CM

## 2024-09-10 DIAGNOSIS — Z17.0 MALIGNANT NEOPLASM OF CENTRAL PORTION OF RIGHT BREAST IN FEMALE, ESTROGEN RECEPTOR POSITIVE (MULTI): ICD-10-CM

## 2024-09-10 DIAGNOSIS — Z72.820 SLEEP DEPRIVATION: Primary | ICD-10-CM

## 2024-09-10 PROCEDURE — 99214 OFFICE O/P EST MOD 30 MIN: CPT | Performed by: STUDENT IN AN ORGANIZED HEALTH CARE EDUCATION/TRAINING PROGRAM

## 2024-09-10 RX ORDER — NALTREXONE HYDROCHLORIDE 50 MG/1
50 TABLET, FILM COATED ORAL DAILY
Qty: 30 TABLET | Refills: 2 | Status: SHIPPED | OUTPATIENT
Start: 2024-09-10 | End: 2024-09-10 | Stop reason: WASHOUT

## 2024-09-10 NOTE — TELEPHONE ENCOUNTER
Patient called in wondering how much longer she is going to have to wait for her 10AM phone visit, per Dr. Whaley, he needs an addition 20 minutes. Patient aware

## 2024-09-10 NOTE — PROGRESS NOTES
Patient ID: Carolin Talamantes is a 74 y.o. female.    The patient presents to clinic today for her history of breast cancer.     Cancer Staging   Malignant neoplasm of central portion of right breast in female, estrogen receptor positive (Multi)  Staging form: Breast, AJCC 8th Edition  - Clinical: Stage IIB (cT3, cN1(f), cM0, G3, ER+, SC+, HER2+) - Signed by Morena Garrido DO on 4/23/2024        Diagnostic/Therapeutic History:    - 02/2024: breast pain followed by feeling a breast mass in her right breast    - 03/26/2024: follow up imaging on 03/26/2024 showed mass measuring 4cm at 12 oclock, 6 CFN    - 04/09/2024: US right breast showed a 5.1 cm mass at 11 oclock 5 CFN with at least 3 morphologically abnormal LN    - 04/09/2024: Right breast mass US guided core needle biopsy showed IDC G3 ER: 95%, SC: 20%, HER2 3+  with right axilla positive for metastatic carcinoma    -04/23/2024: 6 mm RLL nodule for which follow up is needed. No distant disease. Bone scan was negative.     - 05/06/2024: cycle 1 of phesgo and Anastrozole    - 05/28/2024: cycle 2 of phesgo and anastrozole    - 06/17/2024: cycle 3 of phesgo and anastrozole    - 07/09/2024: cycle 4 of phesgo and anastrozole    -07/29/2024: cycle 5 of phesgo and anastrozole    History of Present Illness (HPI)/Interval History:    Fatigue is better, arm and scalp itching.    No diarrhea, no nausea, no vomiting, no fever, no chills.  Doing okay.      PMH: Afib on eliquis and metoprolol    PSH: No surgery    Allergies: no allergies, GI intolerence to erythromycin    Social hx: no smoking, no alcohol    Family hx: no hx of cancer    Menarche at 15, menopause: 57, OCP x1 years,  No HRT      Review of Systems:  14-point ROS otherwise negative, as per HPI.    Past Medical History:   Diagnosis Date    Breast cancer (Multi)     Hx antineoplastic chemo        No past surgical history on file.    Social History     Socioeconomic History    Marital status:    Tobacco Use     Smoking status: Never    Smokeless tobacco: Never   Substance and Sexual Activity    Alcohol use: Not Currently    Drug use: Never       Allergies   Allergen Reactions    Erythromycin GI Upset     Other reaction(s): GI Upset         Current Outpatient Medications:     anastrozole (Arimidex) 1 mg tablet, Take 1 tablet (1 mg total) by mouth once daily.  Swallow whole with a drink of water., Disp: 90 tablet, Rfl: 3    apixaban (Eliquis) 5 mg tablet, Take 1 tablet (5 mg) by mouth twice a day., Disp: , Rfl:     ascorbic acid (Vitamin C) 500 mg ER capsule, Take 1 capsule (500 mg) by mouth once daily., Disp: , Rfl:     ashwagandha root extract 300 mg tablet, Take 1 tablet by mouth once daily., Disp: , Rfl:     calcium carbonate-vitamin D3 (Oyster Shell Calcium-Vit D3) 500 mg-5 mcg (200 unit) tablet, Take 1 tablet by mouth once daily., Disp: , Rfl:     cholecalciferol (Vitamin D-3) 25 MCG (1000 UT) capsule, Take 1 capsule (25 mcg) by mouth once daily., Disp: , Rfl:     co-enzyme Q-10 50 mg capsule, Take 2 capsules (100 mg) by mouth once daily., Disp: , Rfl:     ferrous sulfate, 325 mg ferrous sulfate, tablet, Take 65 mg by mouth once daily with breakfast., Disp: , Rfl:     garlic 500 mg capsule, Take 1 tablet by mouth once daily., Disp: , Rfl:     ginkgo biloba leaf extract 60 mg tablet, Take 1 tablet by mouth once daily., Disp: , Rfl:     glucosamine-chondroitin 500-400 mg tablet, Take 2 tablets by mouth once daily., Disp: , Rfl:     losartan (Cozaar) 25 mg tablet, Take 1 tablet (25 mg) by mouth once daily., Disp: 90 tablet, Rfl: 3    metoprolol succinate XL (Toprol-XL) 25 mg 24 hr tablet, Take 1 tablet (25 mg) by mouth once daily., Disp: 90 tablet, Rfl: 3    multivitamin tablet, Take 1 tablet by mouth once daily., Disp: , Rfl:     turmeric root extract 500 mg tablet, Take 1 tablet by mouth once daily., Disp: , Rfl:     UNABLE TO FIND, Take 1 tablet by mouth once daily. Magnesium 250mg, Disp: , Rfl:     UNABLE TO FIND,  Take 1 tablet by mouth once daily. Fish Oil 1000mg, Disp: , Rfl:     UNABLE TO FIND, Take 1 tablet by mouth once daily. Susan Root, Disp: , Rfl:     vitamin E 180 mg (400 unit) capsule, Take 1 capsule (400 Units) by mouth once daily., Disp: , Rfl:      Objective    BSA: There is no height or weight on file to calculate BSA.  There were no vitals taken for this visit.    ECOG Performance Status: 1    Physical Exam  Vitals reviewed.   Constitutional:       General: She is not in acute distress.     Appearance: She is not toxic-appearing.   HENT:      Mouth/Throat:      Mouth: Mucous membranes are moist.      Pharynx: Oropharynx is clear.   Eyes:      General: No scleral icterus.     Extraocular Movements: Extraocular movements intact.      Conjunctiva/sclera: Conjunctivae normal.   Cardiovascular:      Rate and Rhythm: Normal rate and regular rhythm.      Heart sounds: Normal heart sounds.   Pulmonary:      Effort: Pulmonary effort is normal. No respiratory distress.      Breath sounds: Normal breath sounds.   Musculoskeletal:         General: No swelling.      Right lower leg: Tenderness (calf tenderness) present.      Right ankle: Swelling present.   Skin:     General: Skin is warm and dry.      Coloration: Skin is not jaundiced.   Neurological:      General: No focal deficit present.      Mental Status: She is alert and oriented to person, place, and time.   Psychiatric:         Mood and Affect: Mood normal.         Behavior: Behavior normal.         Thought Content: Thought content normal.         Judgment: Judgment normal.         Laboratory Data:  Lab Results   Component Value Date    WBC 5.9 08/15/2024    HGB 12.2 08/15/2024    HCT 39.0 08/15/2024    MCV 94 08/15/2024     08/15/2024       Chemistry    Lab Results   Component Value Date/Time     08/15/2024 1232    K 4.1 08/15/2024 1232     08/15/2024 1232    CO2 28 08/15/2024 1232    BUN 33 (H) 08/15/2024 1232    CREATININE 1.26 (H)  08/15/2024 1232    Lab Results   Component Value Date/Time    CALCIUM 9.5 08/15/2024 1232    ALKPHOS 90 08/15/2024 1232    AST 24 08/15/2024 1232    ALT 22 08/15/2024 1232    BILITOT 0.7 08/15/2024 1232             Radiology:  BI mammo right diagnostic tomosynthesis, BI US breast limited right  Narrative: Interpreted By:  Bernardo Damon,   STUDY:  BI MAMMO RIGHT DIAGNOSTIC TOMOSYNTHESIS; BI US BREAST LIMITED RIGHT;  8/12/2024 2:36 pm; 8/12/2024 3:06 pm      ACCESSION NUMBER(S):  [id number]; [id number]      ORDERING CLINICIAN:  SALLIE PAGE      INDICATION:  Patient with known right breast cancer status post neoadjuvant  therapy. Assess response.      COMPARISON:  03/26/2020, 04/09/2024      FINDINGS:  MAMMOGRAPHY: 2D and tomosynthesis images were reviewed at 1 mm slice  thickness.      Density:  There are areas of scattered fibroglandular tissue.      Interval decrease in the size of a known mass in the upper outer  right breast at middle depth with associated calcifications.  Calcifications measure up to 7.3 cm on CC view. No new suspicious  masses or calcifications are identified.      ULTRASOUND: Targeted ultrasound was performed of the right breast and  axilla by a registered sonographer with elastography. Known right  breast cancer at 11 o'clock, 5 cm from the nipple measures  approximately 2.5 x 0.9 x 2.1 cm on today's exam with oblique  measurement extending up to 4.4 cm, previously 5.1 x 2.5 x 4.2 cm.  All 3 previously abnormal right axillary lymph nodes demonstrate  normal morphology on today's exam with a biopsy marker located in  lymph node 1.      Impression: Findings are consistent with treatment response. Residual mass  measuring up to 4.4 cm in maximal diameter and calcifications  measuring up to 7.3 cm. If breast conservation is warranted,  bracketed localization is recommended.      BI-RADS CATEGORY:  BI-RADS CATEGORY:  6 Known Biopsy-Proven Malignancy.  Recommendation:  Immediate  Follow-up.  Recommended Date:  Immediate.  Laterality:  Right.      For any future breast imaging appointments, please call [phone]  ([phone]).          MACRO:  None      Signed by: Bernardo Damon 8/12/2024 4:12 PM  Dictation workstation:   [id number]       BI US breast limited right 04/09/2024    Narrative  Interpreted By:  Bernardo Damon,  STUDY:  BI US BREAST LIMITED RIGHT;  4/9/2024 2:03 pm    ACCESSION NUMBER(S):  [id number]    ORDERING CLINICIAN:  DAVID MCCRAY    INDICATION:  Repeat sonographic evaluation of the right breast and axilla was  performed prior to biopsy. Initially, patient declined repeat  mammogram, however she agreed that additional mammographic views  would be useful during her biopsy procedure in these were done during  her post clip imaging.    COMPARISON:  03/26/2024, 03/22/2024    FINDINGS:  Targeted ultrasound was performed of the right breast by a registered  sonographer with elastography. In the right breast at 11 o'clock, 5  cm from the nipple, in the patient's area of palpable concern, there  is an irregular hypoechoic mass containing internal calcifications  measuring at least 5.1 x 2.5 x 4.2 cm. This is highly concerning for  malignancy. Scanning of the right axilla demonstrates at least 3  morphologically abnormal lymph nodes with cortical thickening.    Impression  Highly suspicious right breast mass with right axillary  lymphadenopathy. Biopsy will be subsequently performed. Additional  mammographic images were obtained at the time of post clip imaging.    BI-RADS CATEGORY:    BI-RADS CATEGORY:  5 Highly Suggestive of Malignancy.  Recommendation:  Surgical Consultation and Biopsy.  Recommended Date:  Immediate.  Laterality:  Right.    For any future breast imaging appointments, please call [phone]  ([phone]).      MACRO:  Bernardo Damon discussed the significance and urgency of this critical  finding in person with  DAVID MCCRAY on 4/9/2024 at 1 pm.  (**-RCF-**) Findings:  See  findings.    Signed by: Bernardo Damon 4/9/2024 5:14 PM  Dictation workstation:   [id number]          Assessment/Plan:    74 year old female patient with hx of Afib on metoprolol and eliquis with newly diagnosed HER2 positive BC presenting to discuss treatment options    - 02/2024: breast pain followed by feeling a breast mass in her right breast    - 03/26/2024: follow up imaging on 03/26/2024 showed mass measuring 4cm at 12 oclock, 6 CFN    - 04/09/2024: US right breast showed a 5.1 cm mass at 11 oclock 5 CFN with at least 3 morphologically abnormal LN    - 04/09/2024: Right breast mass US guided core needle biopsy showed IDC G3 ER: 95%, ID: 20%, HER2 3+  with right axilla positive for metastatic carcinoma     I reviewed with her the events that led to her diagnosis of breast cancer. We reviewed all the procedures and diagnostic imaging she underwent thus far. I discussed the features of her breast cancer that include the size, grade, lymph node status and  hormone receptor/ her2-bobby status. Per NCCN guidelines, for HER2 positive breast ca more than 2cm and/or +LN, the current recommendation would be neoadjuvant treatment with dual HER2  blockade as well as chemotherapy. The goal of treatment would be to  downstage her cancer as well as to assess tumor chemosensitivity (whether or not a pathologic complete response is achieved or not). Carolin does not want to pursue chemotherapy and we agreed on doing dual HER2 blockade only with pertuzumab and trastuzumab, with plan for 6 cycles followed by surgery.     Staging scans showed no distant disease  05/06/2024: cycle 1 of phesgo and Anastrozole  05/28/2024: cycle 2 of phesgo  06/17/2024: cycle 3 of phesgo and anastrozole  07/09/2024: cycle 4 of phesgo and anastrozole  07/29/2024: cycle 5 of phesgo and anastrozole    Mild LV dysfunction 45-50%, long standing Afib: 24 hr Holter recommended. July 18, 2024 showed no changes compared to the prior echo     - Grade 1-2  diarrhea: would like to continue rice and banana diet prn. Not interested in trying imodium at this time   - Recurrent candida: self-resolving with OTC treatment, offered nystatin but declined   - Grade 1 fatigue/arthralgias: would like to continue with anastrozole for now  - Wants to hold off therapy for now because of itching, in scalp and upper extremity: does not want any steroids or anti-histamine  - Fup in 3 weeks to discuss resuming therapy  - prescribed naltrexone (per her naturopathic doctor, no interaction with her chemo)    RTC in 3 weeks    Cassy Whaley MD  Hematology and Medical Oncology  [facility]

## 2024-09-10 NOTE — TELEPHONE ENCOUNTER
Patient called to inform team that the dose of her Naltrexone is 0.5-4.5 depending on how she feels. They want her to start at 0.5mg and increase it as needed.    Asking if Dr. Whaley is able to send this in.

## 2024-09-11 NOTE — TELEPHONE ENCOUNTER
Patient called back to review dosing of Naltrexone.  Patient states she thinks it is 0.5mg.  Advised patient that Dr. Whaley also sent message via [employer] with questions on dosing.  Patient states she will look into it and call back again.

## 2024-09-12 RX ORDER — NALTREXONE HYDROCHLORIDE 50 MG/1
50 TABLET, FILM COATED ORAL DAILY
Qty: 30 TABLET | Refills: 1 | Status: SHIPPED | OUTPATIENT
Start: 2024-09-12 | End: 2025-09-12

## 2024-09-23 ENCOUNTER — TELEPHONE (OUTPATIENT)
Dept: ADMISSION | Facility: HOSPITAL | Age: 74
End: 2024-09-23
Payer: MEDICARE

## 2024-09-23 NOTE — TELEPHONE ENCOUNTER
The patient Is calling in regarding the [employer] Reveal testing, states she thought this was going to be ordered for it to be done in her home, please advise. Thank you.

## 2024-09-25 NOTE — TELEPHONE ENCOUNTER
Pt aware that Dr. Whaley and team are working with [employer] rep to get the patient [employer] Reveal done. Aware about insurance stipulation and agreeable to move forward with the testing. Denied further questions at this time

## 2024-10-08 ENCOUNTER — TELEMEDICINE (OUTPATIENT)
Dept: HEMATOLOGY/ONCOLOGY | Facility: CLINIC | Age: 74
End: 2024-10-08
Payer: MEDICARE

## 2024-10-08 ENCOUNTER — TELEPHONE (OUTPATIENT)
Dept: ADMISSION | Facility: HOSPITAL | Age: 74
End: 2024-10-08

## 2024-10-08 DIAGNOSIS — Z17.0 MALIGNANT NEOPLASM OF CENTRAL PORTION OF RIGHT BREAST IN FEMALE, ESTROGEN RECEPTOR POSITIVE: ICD-10-CM

## 2024-10-08 DIAGNOSIS — C50.111 MALIGNANT NEOPLASM OF CENTRAL PORTION OF RIGHT BREAST IN FEMALE, ESTROGEN RECEPTOR POSITIVE: ICD-10-CM

## 2024-10-08 PROCEDURE — 99214 OFFICE O/P EST MOD 30 MIN: CPT | Performed by: STUDENT IN AN ORGANIZED HEALTH CARE EDUCATION/TRAINING PROGRAM

## 2024-10-08 NOTE — TELEPHONE ENCOUNTER
Pt received docusign information today that if medicare does not pay for guardant reveal testing she would be responsible for the cost (over $5000). Form asked for a code that she did not have.   Pt wants to ensure medicare will cover cost of testing or she may not be able to complete testing d/t cost.

## 2024-10-08 NOTE — PROGRESS NOTES
Patient ID: Carolin Talamantes is a 74 y.o. female.    The patient presents to clinic today for her history of breast cancer.     Cancer Staging   Malignant neoplasm of central portion of right breast in female, estrogen receptor positive  Staging form: Breast, AJCC 8th Edition  - Clinical: Stage IIB (cT3, cN1(f), cM0, G3, ER+, KY+, HER2+) - Signed by Morena Garrido DO on 4/23/2024        Diagnostic/Therapeutic History:    - 02/2024: breast pain followed by feeling a breast mass in her right breast    - 03/26/2024: follow up imaging on 03/26/2024 showed mass measuring 4cm at 12 oclock, 6 CFN    - 04/09/2024: US right breast showed a 5.1 cm mass at 11 oclock 5 CFN with at least 3 morphologically abnormal LN    - 04/09/2024: Right breast mass US guided core needle biopsy showed IDC G3 ER: 95%, KY: 20%, HER2 3+  with right axilla positive for metastatic carcinoma    -04/23/2024: 6 mm RLL nodule for which follow up is needed. No distant disease. Bone scan was negative.     - 05/06/2024: cycle 1 of phesgo and Anastrozole    - 05/28/2024: cycle 2 of phesgo and anastrozole    - 06/17/2024: cycle 3 of phesgo and anastrozole    - 07/09/2024: cycle 4 of phesgo and anastrozole    -07/29/2024: cycle 5 of phesgo and anastrozole    History of Present Illness (HPI)/Interval History:    No diarrhea, no nausea, no vomiting, no fever, no chills.  Is still having itching on the scalp and arms that are still available . Is having problems from yeast infections. Is not keen on getting steroids.       PMH: Afib on eliquis and metoprolol    PSH: No surgery    Allergies: no allergies, GI intolerence to erythromycin    Social hx: no smoking, no alcohol    Family hx: no hx of cancer    Menarche at 15, menopause: 57, OCP x1 years,  No HRT      Review of Systems:  14-point ROS otherwise negative, as per HPI.    Past Medical History:   Diagnosis Date    Breast cancer (Multi)     Hx antineoplastic chemo        No past surgical history on  file.    Social History     Socioeconomic History    Marital status:    Tobacco Use    Smoking status: Never    Smokeless tobacco: Never   Substance and Sexual Activity    Alcohol use: Not Currently    Drug use: Never       Allergies   Allergen Reactions    Erythromycin GI Upset     Other reaction(s): GI Upset         Current Outpatient Medications:     anastrozole (Arimidex) 1 mg tablet, Take 1 tablet (1 mg total) by mouth once daily.  Swallow whole with a drink of water., Disp: 90 tablet, Rfl: 3    apixaban (Eliquis) 5 mg tablet, Take 1 tablet (5 mg) by mouth twice a day., Disp: , Rfl:     ascorbic acid (Vitamin C) 500 mg ER capsule, Take 1 capsule (500 mg) by mouth once daily., Disp: , Rfl:     ashwagandha root extract 300 mg tablet, Take 1 tablet by mouth once daily., Disp: , Rfl:     calcium carbonate-vitamin D3 (Oyster Shell Calcium-Vit D3) 500 mg-5 mcg (200 unit) tablet, Take 1 tablet by mouth once daily., Disp: , Rfl:     cholecalciferol (Vitamin D-3) 25 MCG (1000 UT) capsule, Take 1 capsule (25 mcg) by mouth once daily., Disp: , Rfl:     co-enzyme Q-10 50 mg capsule, Take 2 capsules (100 mg) by mouth once daily., Disp: , Rfl:     ferrous sulfate, 325 mg ferrous sulfate, tablet, Take 65 mg by mouth once daily with breakfast., Disp: , Rfl:     garlic 500 mg capsule, Take 1 tablet by mouth once daily., Disp: , Rfl:     ginkgo biloba leaf extract 60 mg tablet, Take 1 tablet by mouth once daily., Disp: , Rfl:     glucosamine-chondroitin 500-400 mg tablet, Take 2 tablets by mouth once daily., Disp: , Rfl:     losartan (Cozaar) 25 mg tablet, Take 1 tablet (25 mg) by mouth once daily., Disp: 90 tablet, Rfl: 3    metoprolol succinate XL (Toprol-XL) 25 mg 24 hr tablet, Take 1 tablet (25 mg) by mouth once daily., Disp: 90 tablet, Rfl: 3    multivitamin tablet, Take 1 tablet by mouth once daily., Disp: , Rfl:     naltrexone (Depade) 50 mg tablet, Take 1 tablet (50 mg) by mouth once daily. 50 mg, dilute in 50 ml  distilled water, shake well, 1mg/1ml solution. Start with 0.5 ml x2 weeks then Q2 weeks increase by 0.5 ml until you sleep better, Disp: 30 tablet, Rfl: 1    turmeric root extract 500 mg tablet, Take 1 tablet by mouth once daily., Disp: , Rfl:     UNABLE TO FIND, Take 1 tablet by mouth once daily. Magnesium 250mg, Disp: , Rfl:     UNABLE TO FIND, Take 1 tablet by mouth once daily. Fish Oil 1000mg, Disp: , Rfl:     UNABLE TO FIND, Take 1 tablet by mouth once daily. Susan Root, Disp: , Rfl:     vitamin E 180 mg (400 unit) capsule, Take 1 capsule (400 Units) by mouth once daily., Disp: , Rfl:      Objective    BSA: There is no height or weight on file to calculate BSA.  There were no vitals taken for this visit.    ECOG Performance Status: 1    Physical Exam  Vitals reviewed.   Constitutional:       General: She is not in acute distress.     Appearance: She is not toxic-appearing.   HENT:      Mouth/Throat:      Mouth: Mucous membranes are moist.      Pharynx: Oropharynx is clear.   Eyes:      General: No scleral icterus.     Extraocular Movements: Extraocular movements intact.      Conjunctiva/sclera: Conjunctivae normal.   Cardiovascular:      Rate and Rhythm: Normal rate and regular rhythm.      Heart sounds: Normal heart sounds.   Pulmonary:      Effort: Pulmonary effort is normal. No respiratory distress.      Breath sounds: Normal breath sounds.   Musculoskeletal:         General: No swelling.      Right lower leg: Tenderness (calf tenderness) present.      Right ankle: Swelling present.   Skin:     General: Skin is warm and dry.      Coloration: Skin is not jaundiced.   Neurological:      General: No focal deficit present.      Mental Status: She is alert and oriented to person, place, and time.   Psychiatric:         Mood and Affect: Mood normal.         Behavior: Behavior normal.         Thought Content: Thought content normal.         Judgment: Judgment normal.         Laboratory Data:  Lab Results    Component Value Date    WBC 5.9 08/15/2024    HGB 12.2 08/15/2024    HCT 39.0 08/15/2024    MCV 94 08/15/2024     08/15/2024       Chemistry    Lab Results   Component Value Date/Time     08/15/2024 1232    K 4.1 08/15/2024 1232     08/15/2024 1232    CO2 28 08/15/2024 1232    BUN 33 (H) 08/15/2024 1232    CREATININE 1.26 (H) 08/15/2024 1232    Lab Results   Component Value Date/Time    CALCIUM 9.5 08/15/2024 1232    ALKPHOS 90 08/15/2024 1232    AST 24 08/15/2024 1232    ALT 22 08/15/2024 1232    BILITOT 0.7 08/15/2024 1232             Radiology:  BI mammo right diagnostic tomosynthesis, BI US breast limited right  Narrative: Interpreted By:  Bernardo Damon,   STUDY:  BI MAMMO RIGHT DIAGNOSTIC TOMOSYNTHESIS; BI US BREAST LIMITED RIGHT;  8/12/2024 2:36 pm; 8/12/2024 3:06 pm      ACCESSION NUMBER(S):  [id number]; [id number]      ORDERING CLINICIAN:  SALLIE PAGE      INDICATION:  Patient with known right breast cancer status post neoadjuvant  therapy. Assess response.      COMPARISON:  03/26/2020, 04/09/2024      FINDINGS:  MAMMOGRAPHY: 2D and tomosynthesis images were reviewed at 1 mm slice  thickness.      Density:  There are areas of scattered fibroglandular tissue.      Interval decrease in the size of a known mass in the upper outer  right breast at middle depth with associated calcifications.  Calcifications measure up to 7.3 cm on CC view. No new suspicious  masses or calcifications are identified.      ULTRASOUND: Targeted ultrasound was performed of the right breast and  axilla by a registered sonographer with elastography. Known right  breast cancer at 11 o'clock, 5 cm from the nipple measures  approximately 2.5 x 0.9 x 2.1 cm on today's exam with oblique  measurement extending up to 4.4 cm, previously 5.1 x 2.5 x 4.2 cm.  All 3 previously abnormal right axillary lymph nodes demonstrate  normal morphology on today's exam with a biopsy marker located in  lymph node 1.      Impression:  Findings are consistent with treatment response. Residual mass  measuring up to 4.4 cm in maximal diameter and calcifications  measuring up to 7.3 cm. If breast conservation is warranted,  bracketed localization is recommended.      BI-RADS CATEGORY:  BI-RADS CATEGORY:  6 Known Biopsy-Proven Malignancy.  Recommendation:  Immediate Follow-up.  Recommended Date:  Immediate.  Laterality:  Right.      For any future breast imaging appointments, please call [phone]  ([phone]).          MACRO:  None      Signed by: Bernardo Damon 8/12/2024 4:12 PM  Dictation workstation:   [id number]       BI US breast limited right 04/09/2024    Narrative  Interpreted By:  Bernardo Damon,  STUDY:  BI US BREAST LIMITED RIGHT;  4/9/2024 2:03 pm    ACCESSION NUMBER(S):  [id number]    ORDERING CLINICIAN:  DAVID MCCRAY    INDICATION:  Repeat sonographic evaluation of the right breast and axilla was  performed prior to biopsy. Initially, patient declined repeat  mammogram, however she agreed that additional mammographic views  would be useful during her biopsy procedure in these were done during  her post clip imaging.    COMPARISON:  03/26/2024, 03/22/2024    FINDINGS:  Targeted ultrasound was performed of the right breast by a registered  sonographer with elastography. In the right breast at 11 o'clock, 5  cm from the nipple, in the patient's area of palpable concern, there  is an irregular hypoechoic mass containing internal calcifications  measuring at least 5.1 x 2.5 x 4.2 cm. This is highly concerning for  malignancy. Scanning of the right axilla demonstrates at least 3  morphologically abnormal lymph nodes with cortical thickening.    Impression  Highly suspicious right breast mass with right axillary  lymphadenopathy. Biopsy will be subsequently performed. Additional  mammographic images were obtained at the time of post clip imaging.    BI-RADS CATEGORY:    BI-RADS CATEGORY:  5 Highly Suggestive of Malignancy.  Recommendation:   Surgical Consultation and Biopsy.  Recommended Date:  Immediate.  Laterality:  Right.    For any future breast imaging appointments, please call [phone]  ([phone]).      MACRO:  Bernardo Damon discussed the significance and urgency of this critical  finding in person with  DAVID MCCRAY on 4/9/2024 at 1 pm.  (**-RCF-**) Findings:  See findings.    Signed by: Bernardo Damon 4/9/2024 5:14 PM  Dictation workstation:   [id number]          Assessment/Plan:    74 year old female patient with hx of Afib on metoprolol and eliquis with newly diagnosed HER2 positive BC presenting to discuss treatment options    - 02/2024: breast pain followed by feeling a breast mass in her right breast    - 03/26/2024: follow up imaging on 03/26/2024 showed mass measuring 4cm at 12 oclock, 6 CFN    - 04/09/2024: US right breast showed a 5.1 cm mass at 11 oclock 5 CFN with at least 3 morphologically abnormal LN    - 04/09/2024: Right breast mass US guided core needle biopsy showed IDC G3 ER: 95%, MI: 20%, HER2 3+  with right axilla positive for metastatic carcinoma     I reviewed with her the events that led to her diagnosis of breast cancer. We reviewed all the procedures and diagnostic imaging she underwent thus far. I discussed the features of her breast cancer that include the size, grade, lymph node status and  hormone receptor/ her2-bobby status. Per NCCN guidelines, for HER2 positive breast ca more than 2cm and/or +LN, the current recommendation would be neoadjuvant treatment with dual HER2  blockade as well as chemotherapy. The goal of treatment would be to  downstage her cancer as well as to assess tumor chemosensitivity (whether or not a pathologic complete response is achieved or not). Carolin does not want to pursue chemotherapy and we agreed on doing dual HER2 blockade only with pertuzumab and trastuzumab, with plan for 6 cycles followed by surgery.     Staging scans showed no distant disease  05/06/2024: cycle 1 of phesgo and  Anastrozole  05/28/2024: cycle 2 of phesgo  06/17/2024: cycle 3 of phesgo and anastrozole  07/09/2024: cycle 4 of phesgo and anastrozole  07/29/2024: cycle 5 of phesgo and anastrozole    Mild LV dysfunction 45-50%, long standing Afib: 24 hr Holter recommended. July 18, 2024 showed no changes compared to the prior echo     - Grade 1 fatigue/arthralgias: would like to continue with anastrozole for now  - Wants to hold off therapy for now because of itching, in scalp and upper extremity: does not want any steroids or anti-histamine  - prescribed naltrexone (per her naturopathic doctor, no interaction with her chemo)  - we sent [employer] per Carolin's request. We are awaiting to see what code is needed for medicare     She will reach out to us when she is ready to follow up. Not on anti-HER2 therapy; and deferring surgery; only on anastrozole currently    Cassy Whaley MD  Hematology and Medical Oncology  [facility]

## 2024-10-10 ENCOUNTER — HOSPITAL ENCOUNTER (OUTPATIENT)
Dept: RADIOLOGY | Facility: HOSPITAL | Age: 74
Discharge: HOME | End: 2024-10-10
Payer: MEDICARE

## 2024-10-10 ENCOUNTER — LAB (OUTPATIENT)
Dept: LAB | Facility: LAB | Age: 74
End: 2024-10-10
Payer: MEDICARE

## 2024-10-10 ENCOUNTER — HOSPITAL ENCOUNTER (OUTPATIENT)
Dept: CARDIOLOGY | Facility: HOSPITAL | Age: 74
Discharge: HOME | End: 2024-10-10
Payer: MEDICARE

## 2024-10-10 DIAGNOSIS — Z17.0 MALIGNANT NEOPLASM OF CENTRAL PORTION OF RIGHT BREAST IN FEMALE, ESTROGEN RECEPTOR POSITIVE: ICD-10-CM

## 2024-10-10 DIAGNOSIS — Z51.81 ENCOUNTER FOR MONITORING CARDIOTOXIC DRUG THERAPY: ICD-10-CM

## 2024-10-10 DIAGNOSIS — I51.9 RADIATION INDUCED CARDIOTOXICITY: ICD-10-CM

## 2024-10-10 DIAGNOSIS — C50.111 MALIGNANT NEOPLASM OF CENTRAL PORTION OF RIGHT BREAST IN FEMALE, ESTROGEN RECEPTOR POSITIVE: ICD-10-CM

## 2024-10-10 DIAGNOSIS — E78.2 MODERATE MIXED HYPERLIPIDEMIA NOT REQUIRING STATIN THERAPY: ICD-10-CM

## 2024-10-10 DIAGNOSIS — Z13.6 ENCOUNTER FOR SCREENING FOR CARDIOVASCULAR DISORDERS: ICD-10-CM

## 2024-10-10 DIAGNOSIS — E55.9 VITAMIN D DEFICIENCY, UNSPECIFIED: ICD-10-CM

## 2024-10-10 DIAGNOSIS — C50.911 MALIGNANT NEOPLASM OF UNSPECIFIED SITE OF RIGHT FEMALE BREAST: ICD-10-CM

## 2024-10-10 DIAGNOSIS — Z79.899 ENCOUNTER FOR MONITORING CARDIOTOXIC DRUG THERAPY: ICD-10-CM

## 2024-10-10 DIAGNOSIS — I51.89 MILD LEFT VENTRICULAR SYSTOLIC DYSFUNCTION (LVSD): ICD-10-CM

## 2024-10-10 DIAGNOSIS — E03.8 TSH (THYROID-STIMULATING HORMONE DEFICIENCY): ICD-10-CM

## 2024-10-10 DIAGNOSIS — I48.0 PAROXYSMAL ATRIAL FIBRILLATION (MULTI): ICD-10-CM

## 2024-10-10 LAB
25(OH)D3 SERPL-MCNC: 68 NG/ML (ref 30–100)
ALBUMIN SERPL BCP-MCNC: 4.2 G/DL (ref 3.4–5)
ALP SERPL-CCNC: 88 U/L (ref 33–136)
ALT SERPL W P-5'-P-CCNC: 26 U/L (ref 7–45)
ANION GAP SERPL CALC-SCNC: 10 MMOL/L (ref 10–20)
AORTIC VALVE MEAN GRADIENT: 4 MMHG
AORTIC VALVE PEAK VELOCITY: 1.41 M/S
AST SERPL W P-5'-P-CCNC: 24 U/L (ref 9–39)
AV PEAK GRADIENT: 8 MMHG
BASOPHILS # BLD AUTO: 0.03 X10*3/UL (ref 0–0.1)
BASOPHILS NFR BLD AUTO: 0.6 %
BILIRUB SERPL-MCNC: 0.5 MG/DL (ref 0–1.2)
BUN SERPL-MCNC: 28 MG/DL (ref 6–23)
CALCIUM SERPL-MCNC: 9.5 MG/DL (ref 8.6–10.3)
CHLORIDE SERPL-SCNC: 107 MMOL/L (ref 98–107)
CHOLEST SERPL-MCNC: 205 MG/DL (ref 0–199)
CHOLESTEROL/HDL RATIO: 4
CO2 SERPL-SCNC: 29 MMOL/L (ref 21–32)
CREAT SERPL-MCNC: 1.04 MG/DL (ref 0.5–1.05)
EGFRCR SERPLBLD CKD-EPI 2021: 57 ML/MIN/1.73M*2
EJECTION FRACTION APICAL 4 CHAMBER: 46.4
EJECTION FRACTION: 45 %
EOSINOPHIL # BLD AUTO: 0.16 X10*3/UL (ref 0–0.4)
EOSINOPHIL NFR BLD AUTO: 3.1 %
ERYTHROCYTE [DISTWIDTH] IN BLOOD BY AUTOMATED COUNT: 13.6 % (ref 11.5–14.5)
ERYTHROCYTE [SEDIMENTATION RATE] IN BLOOD BY WESTERGREN METHOD: 6 MM/H (ref 0–30)
FERRITIN SERPL-MCNC: 64 NG/ML (ref 8–150)
FIBRINOGEN PPP-MCNC: 296 MG/DL (ref 200–400)
GGT SERPL-CCNC: 7 U/L (ref 5–55)
GLUCOSE SERPL-MCNC: 86 MG/DL (ref 74–99)
HCT VFR BLD AUTO: 39.8 % (ref 36–46)
HCYS SERPL-SCNC: 17.56 UMOL/L (ref 5–13.9)
HDLC SERPL-MCNC: 51 MG/DL
HGB BLD-MCNC: 12.6 G/DL (ref 12–16)
IMM GRANULOCYTES # BLD AUTO: 0.01 X10*3/UL (ref 0–0.5)
IMM GRANULOCYTES NFR BLD AUTO: 0.2 % (ref 0–0.9)
LDH SERPL L TO P-CCNC: 222 U/L (ref 84–246)
LDLC SERPL CALC-MCNC: 135 MG/DL
LEFT ATRIUM VOLUME AREA LENGTH INDEX BSA: 47.9 ML/M2
LV EJECTION FRACTION BIPLANE: 49 %
LYMPHOCYTES # BLD AUTO: 1.2 X10*3/UL (ref 0.8–3)
LYMPHOCYTES NFR BLD AUTO: 23.2 %
MCH RBC QN AUTO: 28.6 PG (ref 26–34)
MCHC RBC AUTO-ENTMCNC: 31.7 G/DL (ref 32–36)
MCV RBC AUTO: 91 FL (ref 80–100)
MONOCYTES # BLD AUTO: 0.35 X10*3/UL (ref 0.05–0.8)
MONOCYTES NFR BLD AUTO: 6.8 %
NEUTROPHILS # BLD AUTO: 3.43 X10*3/UL (ref 1.6–5.5)
NEUTROPHILS NFR BLD AUTO: 66.1 %
NON HDL CHOLESTEROL: 154 MG/DL (ref 0–149)
NRBC BLD-RTO: 0 /100 WBCS (ref 0–0)
PLATELET # BLD AUTO: 202 X10*3/UL (ref 150–450)
POTASSIUM SERPL-SCNC: 3.9 MMOL/L (ref 3.5–5.3)
PROT SERPL-MCNC: 6.2 G/DL (ref 6.4–8.2)
RBC # BLD AUTO: 4.4 X10*6/UL (ref 4–5.2)
RIGHT VENTRICLE PEAK SYSTOLIC PRESSURE: 33.5 MMHG
SODIUM SERPL-SCNC: 142 MMOL/L (ref 136–145)
T4 FREE SERPL-MCNC: 0.82 NG/DL (ref 0.61–1.12)
TRIGL SERPL-MCNC: 94 MG/DL (ref 0–149)
TSH SERPL-ACNC: 2.76 MIU/L (ref 0.44–3.98)
VLDL: 19 MG/DL (ref 0–40)
WBC # BLD AUTO: 5.2 X10*3/UL (ref 4.4–11.3)

## 2024-10-10 PROCEDURE — 83090 ASSAY OF HOMOCYSTEINE: CPT

## 2024-10-10 PROCEDURE — 93321 DOPPLER ECHO F-UP/LMTD STD: CPT | Performed by: STUDENT IN AN ORGANIZED HEALTH CARE EDUCATION/TRAINING PROGRAM

## 2024-10-10 PROCEDURE — 80053 COMPREHEN METABOLIC PANEL: CPT

## 2024-10-10 PROCEDURE — 82977 ASSAY OF GGT: CPT

## 2024-10-10 PROCEDURE — 85025 COMPLETE CBC W/AUTO DIFF WBC: CPT

## 2024-10-10 PROCEDURE — 75571 CT HRT W/O DYE W/CA TEST: CPT

## 2024-10-10 PROCEDURE — 82306 VITAMIN D 25 HYDROXY: CPT

## 2024-10-10 PROCEDURE — 93325 DOPPLER ECHO COLOR FLOW MAPG: CPT | Performed by: STUDENT IN AN ORGANIZED HEALTH CARE EDUCATION/TRAINING PROGRAM

## 2024-10-10 PROCEDURE — 84479 ASSAY OF THYROID (T3 OR T4): CPT

## 2024-10-10 PROCEDURE — 83615 LACTATE (LD) (LDH) ENZYME: CPT

## 2024-10-10 PROCEDURE — 85384 FIBRINOGEN ACTIVITY: CPT

## 2024-10-10 PROCEDURE — 84439 ASSAY OF FREE THYROXINE: CPT

## 2024-10-10 PROCEDURE — 82728 ASSAY OF FERRITIN: CPT

## 2024-10-10 PROCEDURE — 84436 ASSAY OF TOTAL THYROXINE: CPT

## 2024-10-10 PROCEDURE — 93321 DOPPLER ECHO F-UP/LMTD STD: CPT

## 2024-10-10 PROCEDURE — 84481 FREE ASSAY (FT-3): CPT

## 2024-10-10 PROCEDURE — 82533 TOTAL CORTISOL: CPT

## 2024-10-10 PROCEDURE — 84305 ASSAY OF SOMATOMEDIN: CPT

## 2024-10-10 PROCEDURE — 86141 C-REACTIVE PROTEIN HS: CPT

## 2024-10-10 PROCEDURE — 84443 ASSAY THYROID STIM HORMONE: CPT

## 2024-10-10 PROCEDURE — 85652 RBC SED RATE AUTOMATED: CPT

## 2024-10-10 PROCEDURE — 93308 TTE F-UP OR LMTD: CPT | Performed by: STUDENT IN AN ORGANIZED HEALTH CARE EDUCATION/TRAINING PROGRAM

## 2024-10-10 PROCEDURE — 80061 LIPID PANEL: CPT

## 2024-10-11 ENCOUNTER — TELEPHONE (OUTPATIENT)
Dept: CARDIOLOGY | Facility: CLINIC | Age: 74
End: 2024-10-11
Payer: MEDICARE

## 2024-10-11 LAB
CORTIS SERPL-MCNC: 9.1 UG/DL (ref 2.5–20)
CRP SERPL HS-MCNC: 0.2 MG/L
FT4I SERPL CALC-MCNC: 2.6 (ref 1.6–4.7)
T3FREE SERPL-MCNC: 2.7 PG/ML (ref 2.3–4.2)
T3RU NFR SERPL: 36 % (ref 24–41)
T4 SERPL-MCNC: 7.1 UG/DL (ref 4.5–11.1)

## 2024-10-11 NOTE — TELEPHONE ENCOUNTER
----- Message from Barbara Stanford sent at 10/11/2024  2:10 PM EDT -----  Pt notified  ----- Message -----  From: Bob Johnson MD  Sent: 10/11/2024   2:00 PM EDT  To: [facility] Clinical Support Staff    Echocardiogram looks largely stable.  No action required at this time.  If she has worsening shortness of breath or lower extremity edema or chest pain.  She should call us back

## 2024-10-12 LAB
BILL ONLY-THYROGLOBULIN: NORMAL
IGF-I SERPL-MCNC: 117 NG/ML (ref 24–222)
IGF-I Z-SCORE SERPL: 0.5
INSULIN FREE SERPL-ACNC: 3 UIU/ML (ref 3–25)
INSULIN SERPL-ACNC: 3 UIU/ML (ref 3–25)
THYROGLOB AB SERPL-ACNC: <0.9 IU/ML (ref 0–4)
THYROGLOB SERPL-MCNC: 9.7 NG/ML (ref 1.3–31.8)
THYROGLOB SERPL-MCNC: NORMAL NG/ML (ref 1.3–31.8)

## 2024-10-15 LAB — T3REVERSE SERPL-MCNC: 18 NG/DL (ref 9–27)

## 2024-10-24 ENCOUNTER — APPOINTMENT (OUTPATIENT)
Dept: CARDIOLOGY | Facility: CLINIC | Age: 74
End: 2024-10-24
Payer: MEDICARE

## 2024-10-30 ENCOUNTER — TELEPHONE (OUTPATIENT)
Dept: HEMATOLOGY/ONCOLOGY | Facility: CLINIC | Age: 74
End: 2024-10-30
Payer: COMMERCIAL

## 2024-10-30 ENCOUNTER — TELEPHONE (OUTPATIENT)
Dept: ADMISSION | Facility: HOSPITAL | Age: 74
End: 2024-10-30
Payer: COMMERCIAL

## 2024-10-31 ENCOUNTER — APPOINTMENT (OUTPATIENT)
Dept: CARDIOLOGY | Facility: CLINIC | Age: 74
End: 2024-10-31
Payer: MEDICARE

## 2024-11-11 ENCOUNTER — TELEPHONE (OUTPATIENT)
Dept: ADMISSION | Facility: HOSPITAL | Age: 74
End: 2024-11-11
Payer: COMMERCIAL

## 2024-11-11 NOTE — TELEPHONE ENCOUNTER
Patient calling because she does not see the results of her [employer] Test in My Chart. Would like a copy of her [employer] Test available to her in My Chart or sent her. Per chart review was unable to find results.

## 2024-11-14 ENCOUNTER — TELEPHONE (OUTPATIENT)
Dept: HEMATOLOGY/ONCOLOGY | Facility: CLINIC | Age: 74
End: 2024-11-14

## 2024-11-21 ENCOUNTER — TELEPHONE (OUTPATIENT)
Dept: ADMISSION | Facility: HOSPITAL | Age: 74
End: 2024-11-21
Payer: COMMERCIAL

## 2024-11-21 NOTE — TELEPHONE ENCOUNTER
Pt is requesting results from [employer] Reveal be placed in her my chart so that she can share with her naturopathic doctor.   Pt also requesting lab orders be placed by Dr Whaley team per request of naturopathtoni MEDINA:    - CA 15-3; CA 27.29; Thyroglobulin and Antithyroglobulin; SEGF plasma; uric acid; TSH; thyroid paradox antibody; T3 Reverse, free and uptake; T4 total and free; HS-CRP; CBC/diff; CMP; Homocysteine; insulin, fasting; ESR; IGF-1; Hgb A1C; galectin-3; fibrinogen; copper.     Pt will have labs completed at [facility]

## 2024-11-21 NOTE — TELEPHONE ENCOUNTER
Pt notified, per team directive, Dr Whaley will address lab order requests upon his return next week. Team has mailed and faxed [employer] Reveal results to both pt and naturopathic provider.   Pt will await call from Dr. Whaley next week.

## 2024-11-27 DIAGNOSIS — Z17.0 MALIGNANT NEOPLASM OF CENTRAL PORTION OF RIGHT BREAST IN FEMALE, ESTROGEN RECEPTOR POSITIVE: Primary | ICD-10-CM

## 2024-11-27 DIAGNOSIS — I70.0 ATHEROSCLEROSIS OF AORTA (CMS-HCC): ICD-10-CM

## 2024-11-27 DIAGNOSIS — C50.111 MALIGNANT NEOPLASM OF CENTRAL PORTION OF RIGHT BREAST IN FEMALE, ESTROGEN RECEPTOR POSITIVE: Primary | ICD-10-CM

## 2024-11-27 DIAGNOSIS — E16.2 HYPOGLYCEMIA, UNSPECIFIED: ICD-10-CM

## 2024-11-27 DIAGNOSIS — D89.89 OTHER SPECIFIED DISORDERS INVOLVING THE IMMUNE MECHANISM, NOT ELSEWHERE CLASSIFIED: ICD-10-CM

## 2024-11-27 DIAGNOSIS — E78.5 HYPERLIPIDEMIA, UNSPECIFIED HYPERLIPIDEMIA TYPE: ICD-10-CM

## 2024-11-27 NOTE — TELEPHONE ENCOUNTER
Pt notified labs are ordered this time. Per Dr. Whaley - he can only continue to order labs if pt continues to see him in clinic.   Pt in agreement with plan and will make FUV appt

## 2024-12-03 ENCOUNTER — LAB (OUTPATIENT)
Dept: LAB | Facility: LAB | Age: 74
End: 2024-12-03
Payer: COMMERCIAL

## 2024-12-03 DIAGNOSIS — C50.111 MALIGNANT NEOPLASM OF CENTRAL PORTION OF RIGHT BREAST IN FEMALE, ESTROGEN RECEPTOR POSITIVE: ICD-10-CM

## 2024-12-03 DIAGNOSIS — D89.89 OTHER SPECIFIED DISORDERS INVOLVING THE IMMUNE MECHANISM, NOT ELSEWHERE CLASSIFIED: ICD-10-CM

## 2024-12-03 DIAGNOSIS — I70.0 ATHEROSCLEROSIS OF AORTA (CMS-HCC): ICD-10-CM

## 2024-12-03 DIAGNOSIS — E16.2 HYPOGLYCEMIA, UNSPECIFIED: ICD-10-CM

## 2024-12-03 DIAGNOSIS — Z17.0 MALIGNANT NEOPLASM OF CENTRAL PORTION OF RIGHT BREAST IN FEMALE, ESTROGEN RECEPTOR POSITIVE: ICD-10-CM

## 2024-12-03 DIAGNOSIS — E78.5 HYPERLIPIDEMIA, UNSPECIFIED HYPERLIPIDEMIA TYPE: ICD-10-CM

## 2024-12-03 LAB
ALBUMIN SERPL BCP-MCNC: 4 G/DL (ref 3.4–5)
ALP SERPL-CCNC: 90 U/L (ref 33–136)
ALT SERPL W P-5'-P-CCNC: 18 U/L (ref 7–45)
ANION GAP SERPL CALC-SCNC: 10 MMOL/L (ref 10–20)
AST SERPL W P-5'-P-CCNC: 17 U/L (ref 9–39)
BASOPHILS # BLD AUTO: 0.03 X10*3/UL (ref 0–0.1)
BASOPHILS NFR BLD AUTO: 0.8 %
BILIRUB SERPL-MCNC: 0.6 MG/DL (ref 0–1.2)
BUN SERPL-MCNC: 32 MG/DL (ref 6–23)
CALCIUM SERPL-MCNC: 9.4 MG/DL (ref 8.6–10.3)
CANCER AG27-29 SERPL-ACNC: 26.5 U/ML (ref 0–38.6)
CHLORIDE SERPL-SCNC: 106 MMOL/L (ref 98–107)
CO2 SERPL-SCNC: 29 MMOL/L (ref 21–32)
CREAT SERPL-MCNC: 1.15 MG/DL (ref 0.5–1.05)
CRP SERPL HS-MCNC: 0.2 MG/L
EGFRCR SERPLBLD CKD-EPI 2021: 51 ML/MIN/1.73M*2
EOSINOPHIL # BLD AUTO: 0.13 X10*3/UL (ref 0–0.4)
EOSINOPHIL NFR BLD AUTO: 3.3 %
ERYTHROCYTE [DISTWIDTH] IN BLOOD BY AUTOMATED COUNT: 13.5 % (ref 11.5–14.5)
ERYTHROCYTE [SEDIMENTATION RATE] IN BLOOD BY WESTERGREN METHOD: 9 MM/H (ref 0–30)
EST. AVERAGE GLUCOSE BLD GHB EST-MCNC: 114 MG/DL
FIBRINOGEN PPP-MCNC: 295 MG/DL (ref 200–400)
GLUCOSE SERPL-MCNC: 99 MG/DL (ref 74–99)
HBA1C MFR BLD: 5.6 %
HCT VFR BLD AUTO: 41.6 % (ref 36–46)
HCYS SERPL-SCNC: 15.56 UMOL/L (ref 5–13.9)
HGB BLD-MCNC: 13.2 G/DL (ref 12–16)
IMM GRANULOCYTES # BLD AUTO: 0.01 X10*3/UL (ref 0–0.5)
IMM GRANULOCYTES NFR BLD AUTO: 0.3 % (ref 0–0.9)
INSULIN P FAST SERPL-ACNC: 5 UIU/ML (ref 3–25)
INSULIN SERPL-ACNC: 5 UIU/ML (ref 3–25)
LYMPHOCYTES # BLD AUTO: 1.19 X10*3/UL (ref 0.8–3)
LYMPHOCYTES NFR BLD AUTO: 29.8 %
MCH RBC QN AUTO: 29.3 PG (ref 26–34)
MCHC RBC AUTO-ENTMCNC: 31.7 G/DL (ref 32–36)
MCV RBC AUTO: 92 FL (ref 80–100)
MONOCYTES # BLD AUTO: 0.32 X10*3/UL (ref 0.05–0.8)
MONOCYTES NFR BLD AUTO: 8 %
NEUTROPHILS # BLD AUTO: 2.32 X10*3/UL (ref 1.6–5.5)
NEUTROPHILS NFR BLD AUTO: 57.8 %
NRBC BLD-RTO: 0 /100 WBCS (ref 0–0)
PLATELET # BLD AUTO: 194 X10*3/UL (ref 150–450)
POTASSIUM SERPL-SCNC: 4 MMOL/L (ref 3.5–5.3)
PROT SERPL-MCNC: 6.1 G/DL (ref 6.4–8.2)
RBC # BLD AUTO: 4.51 X10*6/UL (ref 4–5.2)
SODIUM SERPL-SCNC: 141 MMOL/L (ref 136–145)
T3FREE SERPL-MCNC: 3.3 PG/ML (ref 2.3–4.2)
T4 FREE SERPL-MCNC: 0.98 NG/DL (ref 0.61–1.12)
TSH SERPL-ACNC: 4.38 MIU/L (ref 0.44–3.98)
URATE SERPL-MCNC: 3 MG/DL (ref 2.3–6.7)
WBC # BLD AUTO: 4 X10*3/UL (ref 4.4–11.3)

## 2024-12-05 LAB
COPPER SERPL-MCNC: 101.4 UG/DL (ref 80–155)
IGF-I SERPL-MCNC: 131 NG/ML (ref 24–222)
IGF-I Z-SCORE SERPL: 0.7
THYROGLOB AB SERPL-ACNC: <1 IU/ML (ref 0–0.9)
THYROGLOB SERPL-MCNC: 11.7 NG/ML (ref 1.5–38.5)

## 2024-12-06 LAB — T3REVERSE SERPL-MCNC: 15.9 NG/DL (ref 9–27)

## 2025-01-13 ENCOUNTER — TELEPHONE (OUTPATIENT)
Dept: ADMISSION | Facility: HOSPITAL | Age: 75
End: 2025-01-13
Payer: COMMERCIAL

## 2025-01-13 NOTE — TELEPHONE ENCOUNTER
Patient calls because she never received the copy of the [employer] testing results that were mailed out to her.  She has an upcoming appt with Dr. Whaley on 1/15, she is requesting ahead of time if she can have 2 copies available to her at that appt. She sees a naturopathic doctor who is also requesting a copy of Guard[employer] test results  Forwarding to team

## 2025-01-15 ENCOUNTER — OFFICE VISIT (OUTPATIENT)
Dept: HEMATOLOGY/ONCOLOGY | Facility: CLINIC | Age: 75
End: 2025-01-15
Payer: MEDICARE

## 2025-01-15 VITALS
OXYGEN SATURATION: 98 % | DIASTOLIC BLOOD PRESSURE: 72 MMHG | TEMPERATURE: 97 F | SYSTOLIC BLOOD PRESSURE: 118 MMHG | RESPIRATION RATE: 18 BRPM | BODY MASS INDEX: 24.06 KG/M2 | WEIGHT: 170.1 LBS | HEART RATE: 62 BPM

## 2025-01-15 DIAGNOSIS — R93.3 ABNORMAL FINDINGS ON DIAGNOSTIC IMAGING OF OTHER PARTS OF DIGESTIVE TRACT: ICD-10-CM

## 2025-01-15 DIAGNOSIS — E78.2 MIXED HYPERLIPIDEMIA: ICD-10-CM

## 2025-01-15 DIAGNOSIS — E78.70 DISORDER OF BILE ACID AND CHOLESTEROL METABOLISM, UNSPECIFIED: ICD-10-CM

## 2025-01-15 DIAGNOSIS — C50.111 MALIGNANT NEOPLASM OF CENTRAL PORTION OF RIGHT BREAST IN FEMALE, ESTROGEN RECEPTOR POSITIVE: Primary | ICD-10-CM

## 2025-01-15 DIAGNOSIS — M33.13 OTHER DERMATOMYOSITIS WITHOUT MYOPATHY: ICD-10-CM

## 2025-01-15 DIAGNOSIS — Z17.0 MALIGNANT NEOPLASM OF CENTRAL PORTION OF RIGHT BREAST IN FEMALE, ESTROGEN RECEPTOR POSITIVE: Primary | ICD-10-CM

## 2025-01-15 DIAGNOSIS — R93.89 ABNORMAL FINDINGS ON DIAGNOSTIC IMAGING OF OTHER SPECIFIED BODY STRUCTURES: ICD-10-CM

## 2025-01-15 PROCEDURE — 1159F MED LIST DOCD IN RCRD: CPT | Performed by: STUDENT IN AN ORGANIZED HEALTH CARE EDUCATION/TRAINING PROGRAM

## 2025-01-15 PROCEDURE — 99214 OFFICE O/P EST MOD 30 MIN: CPT | Performed by: STUDENT IN AN ORGANIZED HEALTH CARE EDUCATION/TRAINING PROGRAM

## 2025-01-15 PROCEDURE — 1126F AMNT PAIN NOTED NONE PRSNT: CPT | Performed by: STUDENT IN AN ORGANIZED HEALTH CARE EDUCATION/TRAINING PROGRAM

## 2025-01-15 PROCEDURE — 1036F TOBACCO NON-USER: CPT | Performed by: STUDENT IN AN ORGANIZED HEALTH CARE EDUCATION/TRAINING PROGRAM

## 2025-01-15 ASSESSMENT — COLUMBIA-SUICIDE SEVERITY RATING SCALE - C-SSRS
2. HAVE YOU ACTUALLY HAD ANY THOUGHTS OF KILLING YOURSELF?: NO
6. HAVE YOU EVER DONE ANYTHING, STARTED TO DO ANYTHING, OR PREPARED TO DO ANYTHING TO END YOUR LIFE?: NO
1. IN THE PAST MONTH, HAVE YOU WISHED YOU WERE DEAD OR WISHED YOU COULD GO TO SLEEP AND NOT WAKE UP?: NO

## 2025-01-15 ASSESSMENT — PATIENT HEALTH QUESTIONNAIRE - PHQ9
2. FEELING DOWN, DEPRESSED OR HOPELESS: NOT AT ALL
1. LITTLE INTEREST OR PLEASURE IN DOING THINGS: NOT AT ALL
SUM OF ALL RESPONSES TO PHQ9 QUESTIONS 1 AND 2: 0

## 2025-01-15 ASSESSMENT — ENCOUNTER SYMPTOMS
OCCASIONAL FEELINGS OF UNSTEADINESS: 0
LOSS OF SENSATION IN FEET: 0
DEPRESSION: 0

## 2025-01-15 ASSESSMENT — PAIN SCALES - GENERAL: PAINLEVEL_OUTOF10: 0-NO PAIN

## 2025-01-15 NOTE — PROGRESS NOTES
Patient ID: Carolin Talamantes is a 74 y.o. female.    The patient presents to clinic today for her history of breast cancer.     Cancer Staging   Malignant neoplasm of central portion of right breast in female, estrogen receptor positive  Staging form: Breast, AJCC 8th Edition  - Clinical: Stage IIB (cT3, cN1(f), cM0, G3, ER+, ME+, HER2+) - Signed by Morena Garrido DO on 4/23/2024        Diagnostic/Therapeutic History:    - 02/2024: breast pain followed by feeling a breast mass in her right breast    - 03/26/2024: follow up imaging on 03/26/2024 showed mass measuring 4cm at 12 oclock, 6 CFN    - 04/09/2024: US right breast showed a 5.1 cm mass at 11 oclock 5 CFN with at least 3 morphologically abnormal LN    - 04/09/2024: Right breast mass US guided core needle biopsy showed IDC G3 ER: 95%, ME: 20%, HER2 3+  with right axilla positive for metastatic carcinoma    -04/23/2024: 6 mm RLL nodule for which follow up is needed. No distant disease. Bone scan was negative.     - 05/06/2024: cycle 1 of phesgo and Anastrozole    - 05/28/2024: cycle 2 of phesgo and anastrozole    - 06/17/2024: cycle 3 of phesgo and anastrozole    - 07/09/2024: cycle 4 of phesgo and anastrozole    -07/29/2024: cycle 5 of phesgo and anastrozole    History of Present Illness (HPI)/Interval History:    Patient is following up with naturopathic doctor  and has been holding off herceptin/perjeta because of side effects. I previoulsy explained that those side effect can be managed but she prefers to hold off for now. I am concerned that cancer will get worse and I relayed that concern.     Currently off therapy, follows with naturopathic doctor  Feeling overall better off therapy, is taking anastrozole. No fever, no chills, no CP, no SOB  Does have intermittent itching    PMH: Afib on eliquis and metoprolol    PSH: No surgery    Allergies: no allergies, GI intolerence to erythromycin    Social hx: no smoking, no alcohol    Family hx: no hx of  cancer    Menarche at 15, menopause: 57, OCP x1 years,  No HRT      Review of Systems:  14-point ROS otherwise negative, as per HPI.    Past Medical History:   Diagnosis Date    Breast cancer (Multi)     Hx antineoplastic chemo        No past surgical history on file.    Social History     Socioeconomic History    Marital status:    Tobacco Use    Smoking status: Never     Passive exposure: Never    Smokeless tobacco: Never   Substance and Sexual Activity    Alcohol use: Not Currently    Drug use: Never       Allergies   Allergen Reactions    Erythromycin GI Upset     Other reaction(s): GI Upset         Current Outpatient Medications:     anastrozole (Arimidex) 1 mg tablet, Take 1 tablet (1 mg total) by mouth once daily.  Swallow whole with a drink of water., Disp: 90 tablet, Rfl: 3    apixaban (Eliquis) 5 mg tablet, Take 1 tablet (5 mg) by mouth twice a day., Disp: , Rfl:     ascorbic acid (Vitamin C) 500 mg ER capsule, Take 1 capsule (500 mg) by mouth once daily., Disp: , Rfl:     ashwagandha root extract 300 mg tablet, Take 1 tablet by mouth once daily., Disp: , Rfl:     calcium carbonate-vitamin D3 (Oyster Shell Calcium-Vit D3) 500 mg-5 mcg (200 unit) tablet, Take 1 tablet by mouth once daily., Disp: , Rfl:     cholecalciferol (Vitamin D-3) 25 MCG (1000 UT) capsule, Take 1 capsule (25 mcg) by mouth once daily., Disp: , Rfl:     co-enzyme Q-10 50 mg capsule, Take 2 capsules (100 mg) by mouth once daily., Disp: , Rfl:     ferrous sulfate, 325 mg ferrous sulfate, tablet, Take 65 mg by mouth once daily with breakfast., Disp: , Rfl:     garlic 500 mg capsule, Take 1 tablet by mouth once daily., Disp: , Rfl:     ginkgo biloba leaf extract 60 mg tablet, Take 1 tablet by mouth once daily., Disp: , Rfl:     glucosamine-chondroitin 500-400 mg tablet, Take 2 tablets by mouth once daily., Disp: , Rfl:     losartan (Cozaar) 25 mg tablet, Take 1 tablet (25 mg) by mouth once daily., Disp: 90 tablet, Rfl: 3    metoprolol  succinate XL (Toprol-XL) 25 mg 24 hr tablet, Take 1 tablet (25 mg) by mouth once daily., Disp: 90 tablet, Rfl: 3    multivitamin tablet, Take 1 tablet by mouth once daily., Disp: , Rfl:     naltrexone (Depade) 50 mg tablet, Take 1 tablet (50 mg) by mouth once daily. 50 mg, dilute in 50 ml distilled water, shake well, 1mg/1ml solution. Start with 0.5 ml x2 weeks then Q2 weeks increase by 0.5 ml until you sleep better, Disp: 30 tablet, Rfl: 1    UNABLE TO FIND, Take 1 tablet by mouth once daily. Magnesium 250mg, Disp: , Rfl:     UNABLE TO FIND, Take 1 tablet by mouth once daily. Fish Oil 1000mg, Disp: , Rfl:     UNABLE TO FIND, Take 1 tablet by mouth once daily. Susan Root, Disp: , Rfl:     vitamin E 180 mg (400 unit) capsule, Take 1 capsule (400 Units) by mouth once daily., Disp: , Rfl:     turmeric root extract 500 mg tablet, Take 1 tablet by mouth once daily., Disp: , Rfl:      Objective    BSA: 1.96 meters squared  /72 (BP Location: Left arm, Patient Position: Sitting, BP Cuff Size: Adult)   Pulse 62   Temp 36.1 °C (97 °F) (Temporal)   Resp 18   Wt 77.2 kg (170 lb 1.6 oz)   SpO2 98%   BMI 24.06 kg/m²     ECOG Performance Status: 1    Physical Exam  Vitals reviewed.   Constitutional:       General: She is not in acute distress.     Appearance: She is not toxic-appearing.   HENT:      Mouth/Throat:      Mouth: Mucous membranes are moist.      Pharynx: Oropharynx is clear.   Eyes:      General: No scleral icterus.     Extraocular Movements: Extraocular movements intact.      Conjunctiva/sclera: Conjunctivae normal.   Cardiovascular:      Rate and Rhythm: Normal rate and regular rhythm.      Heart sounds: Normal heart sounds.   Pulmonary:      Effort: Pulmonary effort is normal. No respiratory distress.      Breath sounds: Normal breath sounds.   Musculoskeletal:         General: No swelling.      Right lower leg: Tenderness (calf tenderness) present.      Right ankle: Swelling present.   Skin:      General: Skin is warm and dry.      Coloration: Skin is not jaundiced.   Neurological:      General: No focal deficit present.      Mental Status: She is alert and oriented to person, place, and time.   Psychiatric:         Mood and Affect: Mood normal.         Behavior: Behavior normal.         Thought Content: Thought content normal.         Judgment: Judgment normal.         Laboratory Data:  Lab Results   Component Value Date    WBC 4.0 (L) 12/03/2024    HGB 13.2 12/03/2024    HCT 41.6 12/03/2024    MCV 92 12/03/2024     12/03/2024       Chemistry    Lab Results   Component Value Date/Time     12/03/2024 0854    K 4.0 12/03/2024 0854     12/03/2024 0854    CO2 29 12/03/2024 0854    BUN 32 (H) 12/03/2024 0854    CREATININE 1.15 (H) 12/03/2024 0854    Lab Results   Component Value Date/Time    CALCIUM 9.4 12/03/2024 0854    ALKPHOS 90 12/03/2024 0854    AST 17 12/03/2024 0854    ALT 18 12/03/2024 0854    BILITOT 0.6 12/03/2024 0854             Radiology:  CT cardiac scoring wo IV contrast  Narrative: Interpreted By:  Peter Maloney,   STUDY:  CT CARDIAC SCORING WO IV CONTRAST;  10/10/2024 12:31 pm      INDICATION:  Signs/Symptoms:CARDIOVASCULAR DISORDER.      ,Z13.6 Encounter for screening for cardiovascular disorders      COMPARISON:  None.      ACCESSION NUMBER(S):  [id number]      ORDERING CLINICIAN:  WILMER TELLO      TECHNIQUE:  Using prospective ECG gating, CT scan of the coronary arteries was  performed without intravenous contrast. Coronary calcium scoring  was  performed according to the method of Agatston.      FINDINGS:  The score and distribution of calcium in the coronary arteries is as  follows:      LM 1.25  LAD 0  LCx 0  RCA 0      Total 1.25      The visualized mid/lower ascending thoracic aorta measures 3.7 cm in  diameter. The heart is normal in size. No pericardial effusion is  present.      No gross evidence of mediastinal or hilar lymphadenopathy or masses  is identified.  "The visualized segments of the lungs are normally  expanded.      The visualized subdiaphragmatic structures appear intact.      Impression: 1. Coronary artery calcium score of 1.25*.      *Coronary artery calcium scoring may be helpful in predicting the  risk for future coronary heart disease events.  According to the  [employer] Clinical Expert Consensus  Task Force, such testing provides important prognostic information in  patients with more than one coronary heart disease risk factor. The  coronary artery calcium score correlates with the annual risk of a  non-fatal myocardial infarction or coronary heart disease death.      Coronary artery score            Annual Risk      0-99                             0.4%  100-399                        1.3%  >400                            2.4%      These three \"breakpoints\" correspond to lower, intermediate and high  risk states for future coronary events.  Such information should be  used, along with appropriate clinical judgment, to make decisions  regarding the intensity of risk factor management strategies to treat  blood lipids and to modify other non-lipid coronary risk factors.      Reference: Inavale P et al. Circulation.  2007; 115:[id number]      MACRO:  None      Signed by: Peter Maloney 10/16/2024 1:38 PM  Dictation workstation:   [id number]       BI US breast limited right 04/09/2024    Narrative  Interpreted By:  Bernardo Damon,  STUDY:  BI US BREAST LIMITED RIGHT;  4/9/2024 2:03 pm    ACCESSION NUMBER(S):  [id number]    ORDERING CLINICIAN:  DAVID MCCRAY    INDICATION:  Repeat sonographic evaluation of the right breast and axilla was  performed prior to biopsy. Initially, patient declined repeat  mammogram, however she agreed that additional mammographic views  would be useful during her biopsy procedure in these were done during  her post clip imaging.    COMPARISON:  03/26/2024, 03/22/2024    FINDINGS:  Targeted " ultrasound was performed of the right breast by a registered  sonographer with elastography. In the right breast at 11 o'clock, 5  cm from the nipple, in the patient's area of palpable concern, there  is an irregular hypoechoic mass containing internal calcifications  measuring at least 5.1 x 2.5 x 4.2 cm. This is highly concerning for  malignancy. Scanning of the right axilla demonstrates at least 3  morphologically abnormal lymph nodes with cortical thickening.    Impression  Highly suspicious right breast mass with right axillary  lymphadenopathy. Biopsy will be subsequently performed. Additional  mammographic images were obtained at the time of post clip imaging.    BI-RADS CATEGORY:    BI-RADS CATEGORY:  5 Highly Suggestive of Malignancy.  Recommendation:  Surgical Consultation and Biopsy.  Recommended Date:  Immediate.  Laterality:  Right.    For any future breast imaging appointments, please call [phone] ([phone]).      MACRO:  Bernardo Damon discussed the significance and urgency of this critical  finding in person with  DAVID MCCRAY on 4/9/2024 at 1 pm.  (**-RCF-**) Findings:  See findings.    Signed by: Bernardo Damon 4/9/2024 5:14 PM  Dictation workstation:   [id number]          Assessment/Plan:    74 year old female patient with hx of Afib on metoprolol and eliquis with newly diagnosed HER2 positive BC presenting to discuss treatment options    - 02/2024: breast pain followed by feeling a breast mass in her right breast    - 03/26/2024: follow up imaging on 03/26/2024 showed mass measuring 4cm at 12 oclock, 6 CFN    - 04/09/2024: US right breast showed a 5.1 cm mass at 11 oclock 5 CFN with at least 3 morphologically abnormal LN    - 04/09/2024: Right breast mass US guided core needle biopsy showed IDC G3 ER: 95%, SD: 20%, HER2 3+  with right axilla positive for metastatic carcinoma     I reviewed with her the events that led to her diagnosis of breast cancer. We reviewed all the procedures and diagnostic  imaging she underwent thus far. I discussed the features of her breast cancer that include the size, grade, lymph node status and  hormone receptor/ her2-bobby status. Per NCCN guidelines, for HER2 positive breast ca more than 2cm and/or +LN, the current recommendation would be neoadjuvant treatment with dual HER2  blockade as well as chemotherapy. The goal of treatment would be to  downstage her cancer as well as to assess tumor chemosensitivity (whether or not a pathologic complete response is achieved or not). Carolin does not want to pursue chemotherapy and we agreed on doing dual HER2 blockade only with pertuzumab and trastuzumab, with plan for 6 cycles followed by surgery.     Staging scans showed no distant disease  05/06/2024: cycle 1 of phesgo and Anastrozole  05/28/2024: cycle 2 of phesgo  06/17/2024: cycle 3 of phesgo and anastrozole  07/09/2024: cycle 4 of phesgo and anastrozole  07/29/2024: cycle 5 of phesgo and anastrozole    Mild LV dysfunction 45-50%, long standing Afib: 24 hr Holter recommended. July 18, 2024 showed no changes compared to the prior echo     Patient is following up with naturopathic doctor  and has been holding off  Yandel therapy with herceptin/perjeta because of side effects. I previoulsy explained that those side effect can be managed but she prefers to hold off for now. I am concerned that cancer will get worse and I relayed that concern.-     - currently off therapy; doing better in terms of side effects  - Mammogram and US right ordered  - blood work ordered and will be faxed to naturapthic doctor  - c/w anastrozole    RTC in           Cassy Whaley MD  Hematology and Medical Oncology  [facility]